# Patient Record
Sex: FEMALE | Race: BLACK OR AFRICAN AMERICAN | Employment: UNEMPLOYED | URBAN - METROPOLITAN AREA
[De-identification: names, ages, dates, MRNs, and addresses within clinical notes are randomized per-mention and may not be internally consistent; named-entity substitution may affect disease eponyms.]

---

## 2019-02-06 ENCOUNTER — APPOINTMENT (OUTPATIENT)
Dept: GENERAL RADIOLOGY | Age: 24
End: 2019-02-06
Payer: COMMERCIAL

## 2019-02-06 ENCOUNTER — HOSPITAL ENCOUNTER (EMERGENCY)
Age: 24
Discharge: HOME | End: 2019-02-07
Attending: EMERGENCY MEDICINE
Payer: COMMERCIAL

## 2019-02-06 DIAGNOSIS — F41.1 ANXIETY STATE: Primary | ICD-10-CM

## 2019-02-06 LAB
ALBUMIN SERPL-MCNC: 3.8 G/DL (ref 3.5–5.2)
ALP BLD-CCNC: 66 U/L (ref 35–104)
ALT SERPL-CCNC: 38 U/L (ref 0–32)
ANION GAP SERPL CALCULATED.3IONS-SCNC: 13 MMOL/L (ref 7–16)
AST SERPL-CCNC: 61 U/L (ref 0–31)
BASOPHILS ABSOLUTE: 0.08 E9/L (ref 0–0.2)
BASOPHILS RELATIVE PERCENT: 1 % (ref 0–2)
BILIRUB SERPL-MCNC: <0.2 MG/DL (ref 0–1.2)
BUN BLDV-MCNC: 10 MG/DL (ref 6–20)
CALCIUM SERPL-MCNC: 9.6 MG/DL (ref 8.6–10.2)
CHLORIDE BLD-SCNC: 103 MMOL/L (ref 98–107)
CO2: 22 MMOL/L (ref 22–29)
CREAT SERPL-MCNC: 0.6 MG/DL (ref 0.5–1)
D DIMER: 225 NG/ML DDU
EOSINOPHILS ABSOLUTE: 0.17 E9/L (ref 0.05–0.5)
EOSINOPHILS RELATIVE PERCENT: 2.2 % (ref 0–6)
GFR AFRICAN AMERICAN: >60
GFR NON-AFRICAN AMERICAN: >60 ML/MIN/1.73
GLUCOSE BLD-MCNC: 139 MG/DL (ref 74–99)
HCG, URINE, POC: NEGATIVE
HCT VFR BLD CALC: 29.1 % (ref 34–48)
HEMOGLOBIN: 8.7 G/DL (ref 11.5–15.5)
IMMATURE GRANULOCYTES #: 0.01 E9/L
IMMATURE GRANULOCYTES %: 0.1 % (ref 0–5)
LYMPHOCYTES ABSOLUTE: 4.98 E9/L (ref 1.5–4)
LYMPHOCYTES RELATIVE PERCENT: 64.6 % (ref 20–42)
Lab: NORMAL
MCH RBC QN AUTO: 22.2 PG (ref 26–35)
MCHC RBC AUTO-ENTMCNC: 29.9 % (ref 32–34.5)
MCV RBC AUTO: 74.2 FL (ref 80–99.9)
MONOCYTES ABSOLUTE: 0.57 E9/L (ref 0.1–0.95)
MONOCYTES RELATIVE PERCENT: 7.4 % (ref 2–12)
NEGATIVE QC PASS/FAIL: NORMAL
NEUTROPHILS ABSOLUTE: 1.9 E9/L (ref 1.8–7.3)
NEUTROPHILS RELATIVE PERCENT: 24.7 % (ref 43–80)
PDW BLD-RTO: 17.1 FL (ref 11.5–15)
PLATELET # BLD: 273 E9/L (ref 130–450)
PMV BLD AUTO: 9.9 FL (ref 7–12)
POSITIVE QC PASS/FAIL: NORMAL
POTASSIUM SERPL-SCNC: 3 MMOL/L (ref 3.5–5)
RBC # BLD: 3.92 E12/L (ref 3.5–5.5)
SODIUM BLD-SCNC: 138 MMOL/L (ref 132–146)
TOTAL PROTEIN: 7.3 G/DL (ref 6.4–8.3)
TROPONIN: <0.01 NG/ML (ref 0–0.03)
WBC # BLD: 7.7 E9/L (ref 4.5–11.5)

## 2019-02-06 PROCEDURE — 36415 COLL VENOUS BLD VENIPUNCTURE: CPT

## 2019-02-06 PROCEDURE — 85378 FIBRIN DEGRADE SEMIQUANT: CPT

## 2019-02-06 PROCEDURE — 80053 COMPREHEN METABOLIC PANEL: CPT

## 2019-02-06 PROCEDURE — 71045 X-RAY EXAM CHEST 1 VIEW: CPT

## 2019-02-06 PROCEDURE — 84484 ASSAY OF TROPONIN QUANT: CPT

## 2019-02-06 PROCEDURE — 99283 EMERGENCY DEPT VISIT LOW MDM: CPT

## 2019-02-06 PROCEDURE — 93005 ELECTROCARDIOGRAM TRACING: CPT | Performed by: EMERGENCY MEDICINE

## 2019-02-06 PROCEDURE — 2580000003 HC RX 258: Performed by: EMERGENCY MEDICINE

## 2019-02-06 PROCEDURE — 85025 COMPLETE CBC W/AUTO DIFF WBC: CPT

## 2019-02-06 RX ORDER — HYDROXYZINE HYDROCHLORIDE 50 MG/ML
50 INJECTION, SOLUTION INTRAMUSCULAR ONCE
Status: COMPLETED | OUTPATIENT
Start: 2019-02-06 | End: 2019-02-07

## 2019-02-06 RX ORDER — 0.9 % SODIUM CHLORIDE 0.9 %
1000 INTRAVENOUS SOLUTION INTRAVENOUS ONCE
Status: COMPLETED | OUTPATIENT
Start: 2019-02-06 | End: 2019-02-07

## 2019-02-06 RX ORDER — POTASSIUM CHLORIDE 20 MEQ/1
40 TABLET, EXTENDED RELEASE ORAL ONCE
Status: COMPLETED | OUTPATIENT
Start: 2019-02-06 | End: 2019-02-07

## 2019-02-06 RX ORDER — SODIUM CHLORIDE 9 MG/ML
INJECTION, SOLUTION INTRAVENOUS CONTINUOUS
Status: DISCONTINUED | OUTPATIENT
Start: 2019-02-06 | End: 2019-02-07 | Stop reason: HOSPADM

## 2019-02-06 RX ADMIN — SODIUM CHLORIDE 1000 ML: 9 INJECTION, SOLUTION INTRAVENOUS at 23:35

## 2019-02-07 VITALS
OXYGEN SATURATION: 98 % | RESPIRATION RATE: 18 BRPM | DIASTOLIC BLOOD PRESSURE: 60 MMHG | HEART RATE: 93 BPM | SYSTOLIC BLOOD PRESSURE: 134 MMHG | TEMPERATURE: 98 F | WEIGHT: 110 LBS

## 2019-02-07 PROCEDURE — 96372 THER/PROPH/DIAG INJ SC/IM: CPT

## 2019-02-07 PROCEDURE — 6370000000 HC RX 637 (ALT 250 FOR IP): Performed by: EMERGENCY MEDICINE

## 2019-02-07 PROCEDURE — 6360000002 HC RX W HCPCS: Performed by: EMERGENCY MEDICINE

## 2019-02-07 RX ORDER — HYDROXYZINE PAMOATE 50 MG/1
50 CAPSULE ORAL 3 TIMES DAILY PRN
Qty: 21 CAPSULE | Refills: 0 | Status: SHIPPED | OUTPATIENT
Start: 2019-02-07 | End: 2019-02-14

## 2019-02-07 RX ADMIN — POTASSIUM CHLORIDE 40 MEQ: 20 TABLET, EXTENDED RELEASE ORAL at 00:11

## 2019-02-07 RX ADMIN — HYDROXYZINE HYDROCHLORIDE 50 MG: 50 INJECTION, SOLUTION INTRAMUSCULAR at 00:15

## 2019-02-08 LAB
EKG ATRIAL RATE: 67 BPM
EKG Q-T INTERVAL: 394 MS
EKG QRS DURATION: 86 MS
EKG QTC CALCULATION (BAZETT): 581 MS
EKG R AXIS: 1 DEGREES
EKG T AXIS: -5 DEGREES
EKG VENTRICULAR RATE: 131 BPM

## 2019-02-09 ENCOUNTER — HOSPITAL ENCOUNTER (EMERGENCY)
Age: 24
Discharge: HOME | End: 2019-02-09
Attending: EMERGENCY MEDICINE
Payer: COMMERCIAL

## 2019-02-09 VITALS
WEIGHT: 100 LBS | TEMPERATURE: 98.9 F | OXYGEN SATURATION: 100 % | HEART RATE: 119 BPM | SYSTOLIC BLOOD PRESSURE: 119 MMHG | RESPIRATION RATE: 20 BRPM | DIASTOLIC BLOOD PRESSURE: 71 MMHG | HEIGHT: 64 IN | BODY MASS INDEX: 17.07 KG/M2

## 2019-02-09 DIAGNOSIS — F41.0 PANIC ATTACK: Primary | ICD-10-CM

## 2019-02-09 LAB
ACETAMINOPHEN LEVEL: <5 MCG/ML (ref 10–30)
ALBUMIN SERPL-MCNC: 4.9 G/DL (ref 3.5–5.2)
ALP BLD-CCNC: 69 U/L (ref 35–104)
ALT SERPL-CCNC: 33 U/L (ref 0–32)
AMPHETAMINE SCREEN, URINE: NOT DETECTED
ANION GAP SERPL CALCULATED.3IONS-SCNC: 13 MMOL/L (ref 7–16)
AST SERPL-CCNC: 33 U/L (ref 0–31)
BACTERIA: ABNORMAL /HPF
BARBITURATE SCREEN URINE: NOT DETECTED
BASOPHILS ABSOLUTE: 0.08 E9/L (ref 0–0.2)
BASOPHILS RELATIVE PERCENT: 1.3 % (ref 0–2)
BENZODIAZEPINE SCREEN, URINE: NOT DETECTED
BILIRUB SERPL-MCNC: 0.3 MG/DL (ref 0–1.2)
BILIRUBIN URINE: ABNORMAL
BLOOD, URINE: NEGATIVE
BUN BLDV-MCNC: 9 MG/DL (ref 6–20)
CALCIUM SERPL-MCNC: 10 MG/DL (ref 8.6–10.2)
CANNABINOID SCREEN URINE: NOT DETECTED
CHLORIDE BLD-SCNC: 103 MMOL/L (ref 98–107)
CLARITY: CLEAR
CO2: 22 MMOL/L (ref 22–29)
COCAINE METABOLITE SCREEN URINE: NOT DETECTED
COLOR: YELLOW
CREAT SERPL-MCNC: 0.6 MG/DL (ref 0.5–1)
EOSINOPHILS ABSOLUTE: 0.11 E9/L (ref 0.05–0.5)
EOSINOPHILS RELATIVE PERCENT: 1.7 % (ref 0–6)
ETHANOL: <10 MG/DL (ref 0–0.08)
GFR AFRICAN AMERICAN: >60
GFR NON-AFRICAN AMERICAN: >60 ML/MIN/1.73
GLUCOSE BLD-MCNC: 95 MG/DL (ref 74–99)
GLUCOSE URINE: NEGATIVE MG/DL
HCG, URINE, POC: NEGATIVE
HCT VFR BLD CALC: 32.9 % (ref 34–48)
HEMOGLOBIN: 9.7 G/DL (ref 11.5–15.5)
IMMATURE GRANULOCYTES #: 0 E9/L
IMMATURE GRANULOCYTES %: 0 % (ref 0–5)
KETONES, URINE: ABNORMAL MG/DL
LEUKOCYTE ESTERASE, URINE: ABNORMAL
LYMPHOCYTES ABSOLUTE: 3 E9/L (ref 1.5–4)
LYMPHOCYTES RELATIVE PERCENT: 47.5 % (ref 20–42)
Lab: NORMAL
MCH RBC QN AUTO: 21.7 PG (ref 26–35)
MCHC RBC AUTO-ENTMCNC: 29.5 % (ref 32–34.5)
MCV RBC AUTO: 73.8 FL (ref 80–99.9)
METHADONE SCREEN, URINE: NOT DETECTED
MONOCYTES ABSOLUTE: 0.44 E9/L (ref 0.1–0.95)
MONOCYTES RELATIVE PERCENT: 7 % (ref 2–12)
NEGATIVE QC PASS/FAIL: NORMAL
NEUTROPHILS ABSOLUTE: 2.69 E9/L (ref 1.8–7.3)
NEUTROPHILS RELATIVE PERCENT: 42.5 % (ref 43–80)
NITRITE, URINE: NEGATIVE
OPIATE SCREEN URINE: NOT DETECTED
PDW BLD-RTO: 16.9 FL (ref 11.5–15)
PH UA: 6 (ref 5–9)
PHENCYCLIDINE SCREEN URINE: NOT DETECTED
PLATELET # BLD: 286 E9/L (ref 130–450)
PMV BLD AUTO: 10 FL (ref 7–12)
POSITIVE QC PASS/FAIL: NORMAL
POTASSIUM SERPL-SCNC: 3.4 MMOL/L (ref 3.5–5)
PROPOXYPHENE SCREEN: NOT DETECTED
PROTEIN UA: 30 MG/DL
RBC # BLD: 4.46 E12/L (ref 3.5–5.5)
RBC UA: ABNORMAL /HPF (ref 0–2)
RENAL EPITHELIAL, UA: ABNORMAL /HPF
SALICYLATE, SERUM: <0.3 MG/DL (ref 0–30)
SODIUM BLD-SCNC: 138 MMOL/L (ref 132–146)
SPECIFIC GRAVITY UA: >=1.03 (ref 1–1.03)
TOTAL PROTEIN: 8.5 G/DL (ref 6.4–8.3)
TRICYCLIC ANTIDEPRESSANTS SCREEN SERUM: NEGATIVE NG/ML
UROBILINOGEN, URINE: 0.2 E.U./DL
WBC # BLD: 6.3 E9/L (ref 4.5–11.5)
WBC UA: ABNORMAL /HPF (ref 0–5)

## 2019-02-09 PROCEDURE — 81001 URINALYSIS AUTO W/SCOPE: CPT

## 2019-02-09 PROCEDURE — 99283 EMERGENCY DEPT VISIT LOW MDM: CPT

## 2019-02-09 PROCEDURE — G0480 DRUG TEST DEF 1-7 CLASSES: HCPCS

## 2019-02-09 PROCEDURE — 85025 COMPLETE CBC W/AUTO DIFF WBC: CPT

## 2019-02-09 PROCEDURE — 80053 COMPREHEN METABOLIC PANEL: CPT

## 2019-02-09 PROCEDURE — 36415 COLL VENOUS BLD VENIPUNCTURE: CPT

## 2019-02-09 PROCEDURE — 80307 DRUG TEST PRSMV CHEM ANLYZR: CPT

## 2019-02-09 PROCEDURE — 6370000000 HC RX 637 (ALT 250 FOR IP): Performed by: EMERGENCY MEDICINE

## 2019-02-09 RX ORDER — LORAZEPAM 1 MG/1
2 TABLET ORAL ONCE
Status: COMPLETED | OUTPATIENT
Start: 2019-02-09 | End: 2019-02-09

## 2019-02-09 RX ORDER — LORAZEPAM 1 MG/1
1 TABLET ORAL EVERY 8 HOURS PRN
Qty: 20 TABLET | Refills: 0 | Status: SHIPPED | OUTPATIENT
Start: 2019-02-09 | End: 2019-02-13

## 2019-02-09 RX ADMIN — LORAZEPAM 2 MG: 1 TABLET ORAL at 06:57

## 2023-05-10 LAB
ABO GROUP (TYPE) IN BLOOD: NORMAL
ALANINE AMINOTRANSFERASE (SGPT) (U/L) IN SER/PLAS: 48 U/L (ref 7–45)
ALBUMIN (G/DL) IN SER/PLAS: 4.3 G/DL (ref 3.4–5)
ALKALINE PHOSPHATASE (U/L) IN SER/PLAS: 78 U/L (ref 33–110)
ANION GAP IN SER/PLAS: 14 MMOL/L (ref 10–20)
ANTIBODY SCREEN: NORMAL
ASPARTATE AMINOTRANSFERASE (SGOT) (U/L) IN SER/PLAS: 19 U/L (ref 9–39)
BILIRUBIN TOTAL (MG/DL) IN SER/PLAS: 0.3 MG/DL (ref 0–1.2)
CALCIUM (MG/DL) IN SER/PLAS: 9.8 MG/DL (ref 8.6–10.6)
CARBON DIOXIDE, TOTAL (MMOL/L) IN SER/PLAS: 24 MMOL/L (ref 21–32)
CHLORIDE (MMOL/L) IN SER/PLAS: 104 MMOL/L (ref 98–107)
CREATININE (MG/DL) IN SER/PLAS: 0.47 MG/DL (ref 0.5–1.05)
CREATININE (MG/DL) IN URINE: 155 MG/DL (ref 20–320)
ERYTHROCYTE DISTRIBUTION WIDTH (RATIO) BY AUTOMATED COUNT: 13.2 % (ref 11.5–14.5)
ERYTHROCYTE MEAN CORPUSCULAR HEMOGLOBIN CONCENTRATION (G/DL) BY AUTOMATED: 32.8 G/DL (ref 32–36)
ERYTHROCYTE MEAN CORPUSCULAR VOLUME (FL) BY AUTOMATED COUNT: 81 FL (ref 80–100)
ERYTHROCYTES (10*6/UL) IN BLOOD BY AUTOMATED COUNT: 4.37 X10E12/L (ref 4–5.2)
GFR FEMALE: >90 ML/MIN/1.73M2
GLUCOSE (MG/DL) IN SER/PLAS: 99 MG/DL (ref 74–99)
HEMATOCRIT (%) IN BLOOD BY AUTOMATED COUNT: 35.4 % (ref 36–46)
HEMOGLOBIN (G/DL) IN BLOOD: 11.6 G/DL (ref 12–16)
HEPATITIS B VIRUS SURFACE AG PRESENCE IN SERUM: NONREACTIVE
HEPATITIS C VIRUS AB PRESENCE IN SERUM: NONREACTIVE
HIV 1/ 2 AG/AB SCREEN: NONREACTIVE
LACTATE DEHYDROGENASE (U/L) IN SER/PLAS BY LAC->PYR RXN: 104 U/L (ref 84–246)
LEUKOCYTES (10*3/UL) IN BLOOD BY AUTOMATED COUNT: 8.4 X10E9/L (ref 4.4–11.3)
NRBC (PER 100 WBCS) BY AUTOMATED COUNT: 0 /100 WBC (ref 0–0)
PLATELETS (10*3/UL) IN BLOOD AUTOMATED COUNT: 326 X10E9/L (ref 150–450)
POTASSIUM (MMOL/L) IN SER/PLAS: 3.7 MMOL/L (ref 3.5–5.3)
PROTEIN (MG/DL) IN URINE: 19 MG/DL (ref 5–24)
PROTEIN TOTAL: 7.6 G/DL (ref 6.4–8.2)
PROTEIN/CREATININE (MG/MG) IN URINE: 0.12 MG/MG CREAT (ref 0–0.17)
REFLEX ADDED, ANEMIA PANEL: ABNORMAL
RH FACTOR: NORMAL
SODIUM (MMOL/L) IN SER/PLAS: 138 MMOL/L (ref 136–145)
SYPHILIS TOTAL AB: NONREACTIVE
URATE (MG/DL) IN SER/PLAS: 2.9 MG/DL (ref 2.3–6.7)
UREA NITROGEN (MG/DL) IN SER/PLAS: 7 MG/DL (ref 6–23)

## 2023-05-11 LAB
CHLAMYDIA TRACH., AMPLIFIED: NEGATIVE
N. GONORRHEA, AMPLIFIED: NEGATIVE
URINE CULTURE: NORMAL

## 2023-05-12 LAB
HEMOGLOBIN A2: 2.8 %
HEMOGLOBIN A: 96.2 %
HEMOGLOBIN F: 1 %
HEMOGLOBIN IDENTIFICATION INTERPRETATION: NORMAL
PATH REVIEW-HGB IDENTIFICATION: NORMAL
RUBELLA VIRUS IGG AB: POSITIVE

## 2023-06-03 LAB — LAB MOLECULAR CA TECHNICAL NOTES: NORMAL

## 2023-08-11 LAB — URINE CULTURE: ABNORMAL

## 2023-08-24 ENCOUNTER — HOSPITAL ENCOUNTER (OUTPATIENT)
Dept: DATA CONVERSION | Facility: HOSPITAL | Age: 28
End: 2023-08-24
Attending: OBSTETRICS & GYNECOLOGY
Payer: COMMERCIAL

## 2023-08-24 DIAGNOSIS — O10.912 UNSPECIFIED PRE-EXISTING HYPERTENSION COMPLICATING PREGNANCY, SECOND TRIMESTER (HHS-HCC): ICD-10-CM

## 2023-08-24 DIAGNOSIS — Z3A.25 25 WEEKS GESTATION OF PREGNANCY (HHS-HCC): ICD-10-CM

## 2023-08-24 DIAGNOSIS — O99.342 OTHER MENTAL DISORDERS COMPLICATING PREGNANCY, SECOND TRIMESTER (HHS-HCC): ICD-10-CM

## 2023-08-24 DIAGNOSIS — O26.892 OTHER SPECIFIED PREGNANCY RELATED CONDITIONS, SECOND TRIMESTER (HHS-HCC): ICD-10-CM

## 2023-08-24 DIAGNOSIS — R06.02 SHORTNESS OF BREATH: ICD-10-CM

## 2023-08-24 DIAGNOSIS — F41.9 ANXIETY DISORDER, UNSPECIFIED: ICD-10-CM

## 2023-08-24 DIAGNOSIS — Z20.822 CONTACT WITH AND (SUSPECTED) EXPOSURE TO COVID-19: ICD-10-CM

## 2023-08-24 DIAGNOSIS — M79.18 MYALGIA, OTHER SITE: ICD-10-CM

## 2023-08-24 DIAGNOSIS — Z34.80 ENCOUNTER FOR SUPERVISION OF OTHER NORMAL PREGNANCY, UNSPECIFIED TRIMESTER (HHS-HCC): ICD-10-CM

## 2023-08-24 LAB — SARS-COV-2 RESULT: NOT DETECTED

## 2023-08-25 LAB — URINE CULTURE: NORMAL

## 2023-09-19 ENCOUNTER — HOSPITAL ENCOUNTER (OUTPATIENT)
Dept: DATA CONVERSION | Facility: HOSPITAL | Age: 28
End: 2023-09-20
Attending: OBSTETRICS & GYNECOLOGY
Payer: COMMERCIAL

## 2023-09-19 DIAGNOSIS — Z3A.29 29 WEEKS GESTATION OF PREGNANCY (HHS-HCC): ICD-10-CM

## 2023-09-19 DIAGNOSIS — O99.891 OTHER SPECIFIED DISEASES AND CONDITIONS COMPLICATING PREGNANCY (HHS-HCC): ICD-10-CM

## 2023-09-19 DIAGNOSIS — M79.604 PAIN IN RIGHT LEG: ICD-10-CM

## 2023-09-19 DIAGNOSIS — M79.605 PAIN IN LEFT LEG: ICD-10-CM

## 2023-09-25 PROBLEM — O13.9 GESTATIONAL HYPERTENSION (HHS-HCC): Status: ACTIVE | Noted: 2017-01-01

## 2023-09-29 VITALS
TEMPERATURE: 97.9 F | WEIGHT: 130.07 LBS | HEART RATE: 104 BPM | OXYGEN SATURATION: 99 % | HEIGHT: 64 IN | BODY MASS INDEX: 22.21 KG/M2 | RESPIRATION RATE: 18 BRPM | DIASTOLIC BLOOD PRESSURE: 71 MMHG | SYSTOLIC BLOOD PRESSURE: 114 MMHG

## 2023-09-29 VITALS
TEMPERATURE: 97.9 F | BODY MASS INDEX: 23.98 KG/M2 | DIASTOLIC BLOOD PRESSURE: 65 MMHG | RESPIRATION RATE: 16 BRPM | HEIGHT: 64 IN | SYSTOLIC BLOOD PRESSURE: 112 MMHG | WEIGHT: 140.43 LBS | HEART RATE: 99 BPM | OXYGEN SATURATION: 98 %

## 2023-09-30 NOTE — PROGRESS NOTES
Current Stage:   Stage: Triage     OB Dating:   EDC/EGA:  ·  Final ZAN 01-Dec-2023   ·  EGA 25.6     Subjective Data:   Antepartum:  Vaginal Bleeding: No   Contractions/Abdominal Pain: No   Discharge/Loss of Fluid: No   Fetal Movement: Good   Fevers/Chills: No   Preeclampsia Symptoms: No   Antepartum:    27 yo  at 25.6 wga by ZAN c/w ###, presents for shortness of breath.    The patient states that she began having shortness of breath last night that has been constant since the onset. She also complains of diffuse upper body myalgias, but denies any fevers, chills, cough, chest pain, dizziness, syncope, abdominal pain, nausea,  vomiting, diarrhea, lower extremity pain or swelling.  Patient denies any personal history of DVT or PE.  She states that she has had good fetal movement and no vaginal discharge or leaking of fluid.  She denies any recent sick contacts.    Pregnancy notable for:  -  OBHx: h/o 2 precipitous pre-term labors at   GYN hx:    PMH: HTN, Anxiety  PSH: Denies  Fam hx: Denies  Meds: No current medications  All: NKDA  Social hx: denies t/e/d        Objective Information:    Objective Information:      T   P  R  BP   MAP  SpO2   Value  36.5  93  16  114/68   85  99%  Date/Time  9:59  9:59  9:59  9:59   9:59  9:59  Range  (36.5C - 36.6C )  (93 - 104 )  (16 - 18 )  (114 - 114 )/ (68 - 71 )  (85 - 85 )  (99% - 99% )      Pain reported at  9:59: 7 = Severe      Physical Exam:   Constitutional: Alert, oriented x3, conversational   Obstetric: Fetal HR: 153, Moderate variability, appropriate  for gestational age  [place]: Quiet   Eyes: Sclera white, EOM intact, no discharge or erythema   ENMT: No hearing deficits, no goiter present   Head/Neck: Normocephalic, atraumatic, full ROM intact   Respiratory/Thorax: Normal respiratory effort, no  wheezes, rales or rhonci. No accessory muscle use.   Cardiovascular: S1 S2 mildly tachycardic with regular  rhythm, no murmurs    Gastrointestinal: Soft, Gravid, non-tender   Musculoskeletal: Grossly WNL   Extremities: No edema, discoloration or pain in BLE   Neurological: Speech clear, no obvious deficits   Psychological: Appropriate mood, behavior. Calm and  cooperative   Skin: No rashes or lesions     Assessment and Plan:   Assessment:    29 yo  at 25.6 wga by ZAN c/w ###, presents for shortness of breath.    #Shortness of breath  :: Patient complains of shortness of breath and myalgias x1 day  :: Likely secondary to viral illness, low suspicion of DVT/PE as the patient is not overtly tachycardic or displaying any other signs or symptoms at this time  -EKG with NSR at rate of 89 bpm.  Normal axis.  MO, QRS and QT intervals are all within normal limits.  No ST elevation or T wave inversions.  No STEMI, compared to previous EKG on 2023  -COVID swab negative    #h/o UTI  :: Diagnosed with UTI in  with no acceptable follow-up culture  -We will obtain culture today and call the patient if any urinary tract infection is present.    #Chronic HTN  ::Normotensive here today  :: Not currently on any medications  -Continue to monitor for signs of HTN at home and with follow-up appointments  -Signs and symptoms of sPEC reviewed with the patient    #Anxiety  :: No complaints of anxiety here today and not currently on any medications  -Continue to monitor at follow-up appointments for signs of anxiety/depression    #Maternal Well-being  -All questions and concerns addressed    Staffed with DR. Romero    Plan of Care Reviewed With:  Plan of Care Reviewed With: patient     Attestation:   Note Completion:  I am a:  Resident/Fellow   Attending Attestation I saw and evaluated the patient.  I personally obtained the key and critical portions of the history and physical exam or was physically present for key and  critical portions performed by the resident/fellow. I reviewed the resident/fellow?s documentation and discussed the patient with  the resident/fellow.  I agree with the resident/fellow?s medical decision making as documented in the note.     I personally evaluated the patient on 24-Aug-2023         Electronic Signatures:  Migue Romero)  (Signed 24-Aug-2023 17:07)   Authored: Note Completion   Co-Signer: Current Stage, OB Dating, Subjective Data, Objective Data, Assessment and Plan, Note Completion  Hank Gonzalez (DO (Resident))  (Signed 24-Aug-2023 13:32)   Authored: Current Stage, OB Dating, Subjective Data,  Objective Data, Assessment and Plan, Note Completion      Last Updated: 24-Aug-2023 17:07 by Migue Romero)

## 2023-09-30 NOTE — PROGRESS NOTES
"    Current Stage:   Stage: Triage     OB Dating:   EDC/EGA:  ·  EGA 29.5     Subjective Data:   Antepartum:  Vaginal Bleeding: No   Contractions/Abdominal Pain: No   Discharge/Loss of Fluid: No   Fetal Movement: Good   Fevers/Chills: No   Preeclampsia Symptoms: No   Antepartum:    29 y/o  @ 29.5 presents to triage c/o leg soreness. States that both of her legs have been sore for an uncertain amount of time. Denies any injury to her legs,  denies alleviating or exacerbating factors. Can walk but \"they hurt\" Reports normal fetal movement, denies ctx, lof and vaginal bleeding.      Objective Information:    Objective Information:      T   P  R  BP   MAP  SpO2   Value  36.6  96  16  114/56   78  98%  Date/Time  0:02  0:02  0:02  0:02   0:02  0:02  Range  (36.6C - 36.6C )  (95 - 107 )  (16 - 16 )  (112 - 114 )/ (56 - 65 )  (78 - 78 )  (98% - 100% )      Pain reported at  0:02: 6 = Moderate      Physical Exam:   Constitutional: alert, oriented, appears comfortable   Obstetric: , mod variability, +accels, no  decels  [place] none  SVE deferred   Extremities: no calf tenderness, reflexes 2+  BLLE with no edema, no lesions or bruising visualized, full ROM     Assessment and Plan:   Assessment:    a: 29 y/o  @ 29.5  leg soreness likely 2/2 third trimester pregnancy, no suspicion for DVT or acute injury/condition  reactive nst    p: discharge home w/precautions, discussed regular tylenol as needed  pt to [place] to schedule appt and/or follow up as needed    gayathri ordonez cnm      Electronic Signatures:  Gayathri Ordonez (APRN-MARQUISM)  (Signed 20-Sep-2023 00:33)   Authored: Current Stage, OB Dating, Subjective Data,  Objective Data, Assessment and Plan, Note Completion      Last Updated: 20-Sep-2023 00:33 by Gayathri Ordonez (APRN-MARQUISM)   "

## 2023-10-13 ENCOUNTER — ANCILLARY PROCEDURE (OUTPATIENT)
Dept: RADIOLOGY | Facility: CLINIC | Age: 28
End: 2023-10-13
Payer: COMMERCIAL

## 2023-10-13 DIAGNOSIS — O10.919 CHRONIC HYPERTENSION AFFECTING PREGNANCY (HHS-HCC): ICD-10-CM

## 2023-10-13 PROCEDURE — 76816 OB US FOLLOW-UP PER FETUS: CPT

## 2023-10-13 PROCEDURE — 76816 OB US FOLLOW-UP PER FETUS: CPT | Performed by: OBSTETRICS & GYNECOLOGY

## 2023-10-13 PROCEDURE — 76819 FETAL BIOPHYS PROFIL W/O NST: CPT | Performed by: OBSTETRICS & GYNECOLOGY

## 2023-10-13 PROCEDURE — 76819 FETAL BIOPHYS PROFIL W/O NST: CPT

## 2023-11-10 ENCOUNTER — ANCILLARY PROCEDURE (OUTPATIENT)
Dept: RADIOLOGY | Facility: CLINIC | Age: 28
End: 2023-11-10
Payer: COMMERCIAL

## 2023-11-10 ENCOUNTER — ROUTINE PRENATAL (OUTPATIENT)
Dept: OBSTETRICS AND GYNECOLOGY | Facility: CLINIC | Age: 28
End: 2023-11-10
Payer: COMMERCIAL

## 2023-11-10 ENCOUNTER — LAB (OUTPATIENT)
Dept: LAB | Facility: LAB | Age: 28
End: 2023-11-10
Payer: COMMERCIAL

## 2023-11-10 VITALS
HEART RATE: 118 BPM | WEIGHT: 150.2 LBS | SYSTOLIC BLOOD PRESSURE: 103 MMHG | DIASTOLIC BLOOD PRESSURE: 70 MMHG | BODY MASS INDEX: 25.8 KG/M2

## 2023-11-10 DIAGNOSIS — Z30.09 CONTRACEPTIVE EDUCATION: ICD-10-CM

## 2023-11-10 DIAGNOSIS — O10.013 PRE-EXISTING ESSENTIAL HYPERTENSION COMPLICATING PREGNANCY, THIRD TRIMESTER (HHS-HCC): ICD-10-CM

## 2023-11-10 DIAGNOSIS — Z3A.36 36 WEEKS GESTATION OF PREGNANCY (HHS-HCC): ICD-10-CM

## 2023-11-10 DIAGNOSIS — O43.199 MARGINAL INSERTION OF UMBILICAL CORD AFFECTING MANAGEMENT OF MOTHER (HHS-HCC): ICD-10-CM

## 2023-11-10 DIAGNOSIS — Z03.74 ENCOUNTER FOR SUSPECTED PROBLEM WITH FETAL GROWTH RULED OUT: ICD-10-CM

## 2023-11-10 DIAGNOSIS — O09.33 LIMITED PRENATAL CARE IN THIRD TRIMESTER (HHS-HCC): ICD-10-CM

## 2023-11-10 DIAGNOSIS — Z3A.36 36 WEEKS GESTATION OF PREGNANCY (HHS-HCC): Primary | ICD-10-CM

## 2023-11-10 LAB
ERYTHROCYTE [DISTWIDTH] IN BLOOD BY AUTOMATED COUNT: 13.2 % (ref 11.5–14.5)
GLUCOSE 1H P 50 G GLC PO SERPL-MCNC: 131 MG/DL
HCT VFR BLD AUTO: 29.5 % (ref 36–46)
HGB BLD-MCNC: 9.2 G/DL (ref 12–16)
MCH RBC QN AUTO: 27.3 PG (ref 26–34)
MCHC RBC AUTO-ENTMCNC: 31.2 G/DL (ref 32–36)
MCV RBC AUTO: 88 FL (ref 80–100)
NRBC BLD-RTO: 0 /100 WBCS (ref 0–0)
PLATELET # BLD AUTO: 240 X10*3/UL (ref 150–450)
RBC # BLD AUTO: 3.37 X10*6/UL (ref 4–5.2)
WBC # BLD AUTO: 9.1 X10*3/UL (ref 4.4–11.3)

## 2023-11-10 PROCEDURE — 99214 OFFICE O/P EST MOD 30 MIN: CPT | Mod: TH,25

## 2023-11-10 PROCEDURE — 82746 ASSAY OF FOLIC ACID SERUM: CPT

## 2023-11-10 PROCEDURE — 87081 CULTURE SCREEN ONLY: CPT

## 2023-11-10 PROCEDURE — 76816 OB US FOLLOW-UP PER FETUS: CPT

## 2023-11-10 PROCEDURE — 76819 FETAL BIOPHYS PROFIL W/O NST: CPT | Performed by: OBSTETRICS & GYNECOLOGY

## 2023-11-10 PROCEDURE — 86780 TREPONEMA PALLIDUM: CPT

## 2023-11-10 PROCEDURE — 82947 ASSAY GLUCOSE BLOOD QUANT: CPT

## 2023-11-10 PROCEDURE — 82728 ASSAY OF FERRITIN: CPT

## 2023-11-10 PROCEDURE — 83550 IRON BINDING TEST: CPT

## 2023-11-10 PROCEDURE — 76819 FETAL BIOPHYS PROFIL W/O NST: CPT

## 2023-11-10 PROCEDURE — 76816 OB US FOLLOW-UP PER FETUS: CPT | Performed by: OBSTETRICS & GYNECOLOGY

## 2023-11-10 PROCEDURE — 85027 COMPLETE CBC AUTOMATED: CPT

## 2023-11-10 PROCEDURE — 82607 VITAMIN B-12: CPT

## 2023-11-10 PROCEDURE — 99214 OFFICE O/P EST MOD 30 MIN: CPT

## 2023-11-10 PROCEDURE — 36415 COLL VENOUS BLD VENIPUNCTURE: CPT

## 2023-11-11 PROBLEM — O43.199 MARGINAL INSERTION OF UMBILICAL CORD AFFECTING MANAGEMENT OF MOTHER (HHS-HCC): Status: ACTIVE | Noted: 2023-11-11

## 2023-11-11 PROBLEM — O09.33 LIMITED PRENATAL CARE IN THIRD TRIMESTER (HHS-HCC): Status: ACTIVE | Noted: 2023-11-11

## 2023-11-11 LAB
FERRITIN SERPL-MCNC: 14 NG/ML
FOLATE SERPL-MCNC: 12.8 NG/ML
IRON SATN MFR SERPL: NORMAL %
IRON SERPL-MCNC: 42 UG/DL
REFLEX ADDED, ANEMIA PANEL: NORMAL
T PALLIDUM AB SER QL: NONREACTIVE
TIBC SERPL-MCNC: NORMAL UG/DL
UIBC SERPL-MCNC: >450 UG/DL
VIT B12 SERPL-MCNC: 233 PG/ML

## 2023-11-12 DIAGNOSIS — D50.9 IRON DEFICIENCY ANEMIA DURING PREGNANCY (HHS-HCC): Primary | ICD-10-CM

## 2023-11-12 DIAGNOSIS — O99.019 IRON DEFICIENCY ANEMIA DURING PREGNANCY (HHS-HCC): Primary | ICD-10-CM

## 2023-11-12 LAB — GP B STREP GENITAL QL CULT: ABNORMAL

## 2023-11-12 RX ORDER — FERROUS SULFATE 325(65) MG
325 TABLET ORAL 2 TIMES DAILY
Qty: 30 TABLET | Refills: 1 | Status: SHIPPED | OUTPATIENT
Start: 2023-11-12 | End: 2023-12-22 | Stop reason: ALTCHOICE

## 2023-11-12 RX ORDER — DOCUSATE SODIUM 100 MG/1
100 CAPSULE, LIQUID FILLED ORAL 2 TIMES DAILY PRN
Qty: 60 CAPSULE | Refills: 5 | Status: SHIPPED | OUTPATIENT
Start: 2023-11-12 | End: 2023-11-28 | Stop reason: HOSPADM

## 2023-11-12 NOTE — PROGRESS NOTES
Assessment/Plan   Diagnoses and all orders for this visit:  36 weeks gestation of pregnancy  -     Group B Streptococcus (GBS) Prenatal Screen, Culture  -     Syphilis Screen with Reflex; Future  -     Glucose, 1 Hour Screen, Pregnancy; Future  -     CBC Anemia Panel With Reflex,Pregnancy; Future  Limited prenatal care in third trimester  Contraceptive education  Marginal insertion of umbilical cord affecting management of mother      Coping mechanisms and pain management options during labor discussed, plans on epidural  Postpartum contraception options discussed, desires TL - consent signed today. She is aware of 30 days consent waiting period.   Discussed routine GBS screening, to be completed today.    Pt has not yet has 2nd trimester labs - CBC, syphilis, and GTT ordered today.     Reviewed s/sx of labor, warning signs, fetal movement counts, and when to call provider  Follow up in 1 week for a routine prenatal visit.    EVIE Weathers-NISHI    Subjective     Imelda Mendoza is a 28 y.o.  at 36w4d with a working estimated date of delivery of 2023, by Ultrasound who presents for a routine prenatal visit. She denies vaginal bleeding, leakage of fluid, decreased fetal movements, or contractions.    Pt has not been seen since 23 weeks. Reports she does not has trouble making to appointments but was not aware she has to schedule OBFU appointments in addition to US appointments. Pt reports she will plan to be here weekly from here on out and does not anticipate trouble making it to appointments. Offered support/resources and she declined.     Her pregnancy is complicated by:  Imelda Mendoza Problems (from 05/10/23 to present)       Problem Noted Resolved    Marginal cord insertion 2017 by Nasrin Verdugo, RN No        Limited prenatal care          Objective   Physical Exam:   Weight: 68.1 kg (150 lb 3.2 oz)  TWG: Not found.  Expected Total Weight Gain: Could not be calculated   [place] BMI: Could not  be calculated  BP: 103/70  Fetal Heart Rate:  (UH done today) Fundal Height (cm): 36 cm Presentation: Vertex  Dilation: 2 Effacement (%): 60 Fetal Station: -3    Postpartum Depression: Not on file        Prenatal Labs  Lab Results   Component Value Date    HGB 9.2 (L) 11/10/2023    HCT 29.5 (L) 11/10/2023     11/10/2023    ABO A 05/10/2023    LABRH POS 05/10/2023    NEISSGONOAMP NEGATIVE 05/10/2023    CHLAMTRACAMP NEGATIVE 05/10/2023    SYPHT Nonreactive 11/10/2023    HEPBSAG NONREACTIVE 05/10/2023    HIV1X2 NONREACTIVE 05/10/2023    URINECULTURE MIXED URETHRAL FREDERICK. 08/24/2023     Lab Results   Component Value Date    GLUC1P 131 11/10/2023     Group B Strep Screen   Date Value Ref Range Status   11/10/2023 Culture in progress  Preliminary        Imaging  The most recent ultrasound was performed on The most recent ultrasound study is not finalized with a study GA of The most recent ultrasound study is not finalized and EFW of The most recent ultrasound study is not finalized.  The most recent ultrasound study is not finalized  The most recent ultrasound study is not finalized

## 2023-11-13 ENCOUNTER — PHARMACY VISIT (OUTPATIENT)
Dept: PHARMACY | Facility: CLINIC | Age: 28
End: 2023-11-13
Payer: MEDICARE

## 2023-11-13 DIAGNOSIS — O99.019 ANTEPARTUM ANEMIA (HHS-HCC): Primary | ICD-10-CM

## 2023-11-13 PROCEDURE — RXMED WILLOW AMBULATORY MEDICATION CHARGE

## 2023-11-13 RX ORDER — DIPHENHYDRAMINE HYDROCHLORIDE 50 MG/ML
50 INJECTION INTRAMUSCULAR; INTRAVENOUS AS NEEDED
OUTPATIENT
Start: 2023-11-13

## 2023-11-13 RX ORDER — EPINEPHRINE 0.3 MG/.3ML
0.3 INJECTION SUBCUTANEOUS EVERY 5 MIN PRN
OUTPATIENT
Start: 2023-11-13

## 2023-11-13 RX ORDER — FAMOTIDINE 10 MG/ML
20 INJECTION INTRAVENOUS ONCE AS NEEDED
OUTPATIENT
Start: 2023-11-13

## 2023-11-13 RX ORDER — ALBUTEROL SULFATE 0.83 MG/ML
3 SOLUTION RESPIRATORY (INHALATION) AS NEEDED
OUTPATIENT
Start: 2023-11-13

## 2023-11-14 ENCOUNTER — TELEPHONE (OUTPATIENT)
Dept: OBSTETRICS AND GYNECOLOGY | Facility: HOSPITAL | Age: 28
End: 2023-11-14
Payer: COMMERCIAL

## 2023-11-17 ENCOUNTER — ROUTINE PRENATAL (OUTPATIENT)
Dept: OBSTETRICS AND GYNECOLOGY | Facility: CLINIC | Age: 28
End: 2023-11-17
Payer: COMMERCIAL

## 2023-11-17 VITALS — SYSTOLIC BLOOD PRESSURE: 120 MMHG | DIASTOLIC BLOOD PRESSURE: 77 MMHG | WEIGHT: 152 LBS | BODY MASS INDEX: 26.11 KG/M2

## 2023-11-17 DIAGNOSIS — O09.33 LIMITED PRENATAL CARE IN THIRD TRIMESTER (HHS-HCC): ICD-10-CM

## 2023-11-17 DIAGNOSIS — R82.71 GBS BACTERIURIA: ICD-10-CM

## 2023-11-17 DIAGNOSIS — O43.199 MARGINAL INSERTION OF UMBILICAL CORD AFFECTING MANAGEMENT OF MOTHER (HHS-HCC): ICD-10-CM

## 2023-11-17 DIAGNOSIS — D50.8 OTHER IRON DEFICIENCY ANEMIA: ICD-10-CM

## 2023-11-17 DIAGNOSIS — O09.93 SUPERVISION OF HIGH RISK PREGNANCY IN THIRD TRIMESTER (HHS-HCC): Primary | ICD-10-CM

## 2023-11-17 PROBLEM — D50.9 IRON DEFICIENCY ANEMIA: Status: ACTIVE | Noted: 2023-11-13

## 2023-11-17 PROCEDURE — 99213 OFFICE O/P EST LOW 20 MIN: CPT | Mod: GC,TH | Performed by: STUDENT IN AN ORGANIZED HEALTH CARE EDUCATION/TRAINING PROGRAM

## 2023-11-17 PROCEDURE — 99213 OFFICE O/P EST LOW 20 MIN: CPT | Performed by: STUDENT IN AN ORGANIZED HEALTH CARE EDUCATION/TRAINING PROGRAM

## 2023-11-17 ASSESSMENT — ENCOUNTER SYMPTOMS
MUSCULOSKELETAL NEGATIVE: 0
EYES NEGATIVE: 0
PSYCHIATRIC NEGATIVE: 0
NEUROLOGICAL NEGATIVE: 0
RESPIRATORY NEGATIVE: 0
ENDOCRINE NEGATIVE: 0
CARDIOVASCULAR NEGATIVE: 0
CONSTITUTIONAL NEGATIVE: 0
GASTROINTESTINAL NEGATIVE: 0
ALLERGIC/IMMUNOLOGIC NEGATIVE: 0
HEMATOLOGIC/LYMPHATIC NEGATIVE: 0

## 2023-11-18 PROBLEM — O09.93 SUPERVISION OF HIGH RISK PREGNANCY IN THIRD TRIMESTER (HHS-HCC): Status: ACTIVE | Noted: 2023-11-18

## 2023-11-18 NOTE — PROGRESS NOTES
Ob Follow-up  2023    SUBJECTIVE    HPI: Imelda Mendoza is a 28 y.o.  at 37w4d here for RPNV.  She has no contractions, bleeding, or LOF. Reports normal fetal movement.     OBJECTIVE  Visit Vitals  /77 Comment: Pt heart rate 108   Wt 68.9 kg (152 lb)   LMP 2023   BMI 26.11 kg/m²   OB Status Pregnant   Smoking Status Never Assessed   BSA 1.76 m²      FHT: 146    ASSESSMENT & PLAN    Imelda Mendoza is a 28 y.o.  at 37w4d here for the following concerns we addressed today:    Problem List Items Addressed This Visit       GBS bacteriuria    Iron deficiency anemia    Overview     - Continue PO iron  - For IV iron if unresponsive to PO, recheck at next visit         Limited prenatal care in third trimester    Marginal insertion of umbilical cord affecting management of mother    Supervision of high risk pregnancy in third trimester - Primary    Overview     Dating:   [x] Prenatal Labs  [x] 1hr GCT at 24-28wks: 11/10  [x] Tdap (27-36wks): declined, readdress at next visit   [x] Flu Shot: declined   [x] COVID vaccine: declined   [x] GBS at 36 wks: GBS+ UTI  [] Breastfeeding  [] Postpartum Birth control method:   [] 39 weeks discussion of IOL vs. Expectant management:  [] Mode of delivery:            No orders of the defined types were placed in this encounter.     RTC in 1 weeks    Madina Chaudhry MD

## 2023-11-20 NOTE — PROGRESS NOTES
I saw and evaluated the patient. I personally obtained the key and critical portions of the history and physical exam or was physically present for key and critical portions performed by the resident/fellow. I reviewed the resident/fellow's documentation and discussed the patient with the resident/fellow. I agree with the resident/fellow's medical decision making as documented in the note.    Apryl Galeas MD

## 2023-11-26 ENCOUNTER — ANESTHESIA EVENT (OUTPATIENT)
Dept: OBSTETRICS AND GYNECOLOGY | Facility: HOSPITAL | Age: 28
End: 2023-11-26
Payer: COMMERCIAL

## 2023-11-26 ENCOUNTER — ANESTHESIA EVENT (OUTPATIENT)
Dept: POSTPARTUM | Facility: HOSPITAL | Age: 28
End: 2023-11-26
Payer: COMMERCIAL

## 2023-11-26 ENCOUNTER — HOSPITAL ENCOUNTER (INPATIENT)
Facility: HOSPITAL | Age: 28
LOS: 2 days | Discharge: HOME | End: 2023-11-28
Attending: OBSTETRICS & GYNECOLOGY | Admitting: OBSTETRICS & GYNECOLOGY
Payer: COMMERCIAL

## 2023-11-26 ENCOUNTER — ANESTHESIA (OUTPATIENT)
Dept: OBSTETRICS AND GYNECOLOGY | Facility: HOSPITAL | Age: 28
End: 2023-11-26
Payer: COMMERCIAL

## 2023-11-26 ENCOUNTER — ANESTHESIA (OUTPATIENT)
Dept: POSTPARTUM | Facility: HOSPITAL | Age: 28
End: 2023-11-26
Payer: COMMERCIAL

## 2023-11-26 DIAGNOSIS — Z30.011 ENCOUNTER FOR INITIAL PRESCRIPTION OF CONTRACEPTIVE PILLS: ICD-10-CM

## 2023-11-26 DIAGNOSIS — Z37.9 NORMAL LABOR (HHS-HCC): ICD-10-CM

## 2023-11-26 PROBLEM — I10 HTN (HYPERTENSION): Status: ACTIVE | Noted: 2023-11-26

## 2023-11-26 LAB
ABO GROUP (TYPE) IN BLOOD: NORMAL
ANTIBODY SCREEN: NORMAL
APPEARANCE UR: CLEAR
APTT PPP: 23 SECONDS (ref 27–38)
APTT PPP: 27 SECONDS (ref 27–38)
BILIRUB UR STRIP.AUTO-MCNC: NEGATIVE MG/DL
BLOOD EXPIRATION DATE: NORMAL
COLOR UR: YELLOW
DISPENSE STATUS: NORMAL
ERYTHROCYTE [DISTWIDTH] IN BLOOD BY AUTOMATED COUNT: 14 % (ref 11.5–14.5)
ERYTHROCYTE [DISTWIDTH] IN BLOOD BY AUTOMATED COUNT: 14.6 % (ref 11.5–14.5)
ERYTHROCYTE [DISTWIDTH] IN BLOOD BY AUTOMATED COUNT: 14.6 % (ref 11.5–14.5)
FIBRINOGEN PPP-MCNC: 390 MG/DL (ref 200–400)
FIBRINOGEN PPP-MCNC: 430 MG/DL (ref 200–400)
GLUCOSE UR STRIP.AUTO-MCNC: NEGATIVE MG/DL
HCT VFR BLD AUTO: 26.9 % (ref 36–46)
HCT VFR BLD AUTO: 30 % (ref 36–46)
HCT VFR BLD AUTO: 30.6 % (ref 36–46)
HGB BLD-MCNC: 8.7 G/DL (ref 12–16)
HGB BLD-MCNC: 9.4 G/DL (ref 12–16)
HGB BLD-MCNC: 9.6 G/DL (ref 12–16)
INR PPP: 1 (ref 0.9–1.1)
INR PPP: 1 (ref 0.9–1.1)
KETONES UR STRIP.AUTO-MCNC: NEGATIVE MG/DL
LEUKOCYTE ESTERASE UR QL STRIP.AUTO: ABNORMAL
MCH RBC QN AUTO: 27.6 PG (ref 26–34)
MCH RBC QN AUTO: 27.9 PG (ref 26–34)
MCH RBC QN AUTO: 28.4 PG (ref 26–34)
MCHC RBC AUTO-ENTMCNC: 30.7 G/DL (ref 32–36)
MCHC RBC AUTO-ENTMCNC: 32 G/DL (ref 32–36)
MCHC RBC AUTO-ENTMCNC: 32.3 G/DL (ref 32–36)
MCV RBC AUTO: 86 FL (ref 80–100)
MCV RBC AUTO: 86 FL (ref 80–100)
MCV RBC AUTO: 92 FL (ref 80–100)
MUCOUS THREADS #/AREA URNS AUTO: ABNORMAL /LPF
NITRITE UR QL STRIP.AUTO: NEGATIVE
NRBC BLD-RTO: 0 /100 WBCS (ref 0–0)
PH UR STRIP.AUTO: 7 [PH]
PLATELET # BLD AUTO: 184 X10*3/UL (ref 150–450)
PLATELET # BLD AUTO: 222 X10*3/UL (ref 150–450)
PLATELET # BLD AUTO: 230 X10*3/UL (ref 150–450)
POC APPEARANCE, URINE: CLEAR
POC BILIRUBIN, URINE: NEGATIVE
POC BLOOD, URINE: ABNORMAL
POC COLOR, URINE: ABNORMAL
POC GLUCOSE, URINE: NEGATIVE MG/DL
POC KETONES, URINE: NEGATIVE MG/DL
POC LEUKOCYTES, URINE: ABNORMAL
POC NITRITE,URINE: NEGATIVE
POC PH, URINE: 7 PH
POC PROTEIN, URINE: ABNORMAL MG/DL
POC SPECIFIC GRAVITY, URINE: 1.02
POC UROBILINOGEN, URINE: 1 EU/DL
PRODUCT BLOOD TYPE: 6200
PRODUCT CODE: NORMAL
PROT UR STRIP.AUTO-MCNC: NEGATIVE MG/DL
PROTHROMBIN TIME: 10.8 SECONDS (ref 9.8–12.8)
PROTHROMBIN TIME: 11.3 SECONDS (ref 9.8–12.8)
RBC # BLD AUTO: 3.12 X10*6/UL (ref 4–5.2)
RBC # BLD AUTO: 3.31 X10*6/UL (ref 4–5.2)
RBC # BLD AUTO: 3.48 X10*6/UL (ref 4–5.2)
RBC # UR STRIP.AUTO: NEGATIVE /UL
RBC #/AREA URNS AUTO: ABNORMAL /HPF
RH FACTOR (ANTIGEN D): NORMAL
SP GR UR STRIP.AUTO: 1.01
SQUAMOUS #/AREA URNS AUTO: ABNORMAL /HPF
T PALLIDUM AB SER QL: NONREACTIVE
UNIT ABO: NORMAL
UNIT NUMBER: NORMAL
UNIT RH: NORMAL
UNIT VOLUME: 350
UROBILINOGEN UR STRIP.AUTO-MCNC: 2 MG/DL
WBC # BLD AUTO: 19.6 X10*3/UL (ref 4.4–11.3)
WBC # BLD AUTO: 19.8 X10*3/UL (ref 4.4–11.3)
WBC # BLD AUTO: 9.5 X10*3/UL (ref 4.4–11.3)
WBC #/AREA URNS AUTO: ABNORMAL /HPF
XM INTEP: NORMAL

## 2023-11-26 PROCEDURE — 86901 BLOOD TYPING SEROLOGIC RH(D): CPT | Performed by: STUDENT IN AN ORGANIZED HEALTH CARE EDUCATION/TRAINING PROGRAM

## 2023-11-26 PROCEDURE — 36430 TRANSFUSION BLD/BLD COMPNT: CPT

## 2023-11-26 PROCEDURE — 86780 TREPONEMA PALLIDUM: CPT | Performed by: STUDENT IN AN ORGANIZED HEALTH CARE EDUCATION/TRAINING PROGRAM

## 2023-11-26 PROCEDURE — P9016 RBC LEUKOCYTES REDUCED: HCPCS

## 2023-11-26 PROCEDURE — 86920 COMPATIBILITY TEST SPIN: CPT

## 2023-11-26 PROCEDURE — 85610 PROTHROMBIN TIME: CPT | Performed by: STUDENT IN AN ORGANIZED HEALTH CARE EDUCATION/TRAINING PROGRAM

## 2023-11-26 PROCEDURE — 2500000005 HC RX 250 GENERAL PHARMACY W/O HCPCS: Performed by: STUDENT IN AN ORGANIZED HEALTH CARE EDUCATION/TRAINING PROGRAM

## 2023-11-26 PROCEDURE — 3700000018 HC OB ANESTHESIA C-SECTION: Performed by: OBSTETRICS & GYNECOLOGY

## 2023-11-26 PROCEDURE — 96372 THER/PROPH/DIAG INJ SC/IM: CPT | Performed by: STUDENT IN AN ORGANIZED HEALTH CARE EDUCATION/TRAINING PROGRAM

## 2023-11-26 PROCEDURE — 10907ZC DRAINAGE OF AMNIOTIC FLUID, THERAPEUTIC FROM PRODUCTS OF CONCEPTION, VIA NATURAL OR ARTIFICIAL OPENING: ICD-10-PCS | Performed by: NURSE PRACTITIONER

## 2023-11-26 PROCEDURE — 85027 COMPLETE CBC AUTOMATED: CPT | Performed by: STUDENT IN AN ORGANIZED HEALTH CARE EDUCATION/TRAINING PROGRAM

## 2023-11-26 PROCEDURE — 1100000001 HC PRIVATE ROOM DAILY

## 2023-11-26 PROCEDURE — 36415 COLL VENOUS BLD VENIPUNCTURE: CPT | Performed by: STUDENT IN AN ORGANIZED HEALTH CARE EDUCATION/TRAINING PROGRAM

## 2023-11-26 PROCEDURE — 3700000014 HC AN EPIDURAL BLOCK CHARGE: Performed by: OBSTETRICS & GYNECOLOGY

## 2023-11-26 PROCEDURE — 94760 N-INVAS EAR/PLS OXIMETRY 1: CPT

## 2023-11-26 PROCEDURE — 7100000016 HC LABOR RECOVERY PER HOUR: Performed by: OBSTETRICS & GYNECOLOGY

## 2023-11-26 PROCEDURE — 7210000002 HC LABOR PER HOUR

## 2023-11-26 PROCEDURE — 2500000001 HC RX 250 WO HCPCS SELF ADMINISTERED DRUGS (ALT 637 FOR MEDICARE OP): Performed by: STUDENT IN AN ORGANIZED HEALTH CARE EDUCATION/TRAINING PROGRAM

## 2023-11-26 PROCEDURE — 88307 TISSUE EXAM BY PATHOLOGIST: CPT | Performed by: PATHOLOGY

## 2023-11-26 PROCEDURE — 99140 ANES COMP EMERGENCY COND: CPT | Performed by: STUDENT IN AN ORGANIZED HEALTH CARE EDUCATION/TRAINING PROGRAM

## 2023-11-26 PROCEDURE — 10D17ZZ EXTRACTION OF PRODUCTS OF CONCEPTION, RETAINED, VIA NATURAL OR ARTIFICIAL OPENING: ICD-10-PCS | Performed by: OBSTETRICS & GYNECOLOGY

## 2023-11-26 PROCEDURE — 59409 OBSTETRICAL CARE: CPT | Performed by: MIDWIFE

## 2023-11-26 PROCEDURE — 99214 OFFICE O/P EST MOD 30 MIN: CPT | Mod: 25

## 2023-11-26 PROCEDURE — 85384 FIBRINOGEN ACTIVITY: CPT | Performed by: STUDENT IN AN ORGANIZED HEALTH CARE EDUCATION/TRAINING PROGRAM

## 2023-11-26 PROCEDURE — 2500000004 HC RX 250 GENERAL PHARMACY W/ HCPCS (ALT 636 FOR OP/ED)

## 2023-11-26 PROCEDURE — 2500000004 HC RX 250 GENERAL PHARMACY W/ HCPCS (ALT 636 FOR OP/ED): Performed by: STUDENT IN AN ORGANIZED HEALTH CARE EDUCATION/TRAINING PROGRAM

## 2023-11-26 PROCEDURE — 7100000016 HC LABOR RECOVERY PER HOUR

## 2023-11-26 PROCEDURE — 3E033VJ INTRODUCTION OF OTHER HORMONE INTO PERIPHERAL VEIN, PERCUTANEOUS APPROACH: ICD-10-PCS | Performed by: NURSE PRACTITIONER

## 2023-11-26 PROCEDURE — 59410 OBSTETRICAL CARE: CPT | Performed by: MIDWIFE

## 2023-11-26 PROCEDURE — A59414 PR DELIVER PLACENTA: Performed by: STUDENT IN AN ORGANIZED HEALTH CARE EDUCATION/TRAINING PROGRAM

## 2023-11-26 PROCEDURE — 88307 TISSUE EXAM BY PATHOLOGIST: CPT | Mod: TC,SUR | Performed by: STUDENT IN AN ORGANIZED HEALTH CARE EDUCATION/TRAINING PROGRAM

## 2023-11-26 PROCEDURE — 81001 URINALYSIS AUTO W/SCOPE: CPT

## 2023-11-26 RX ORDER — MORPHINE SULFATE 2 MG/ML
2 INJECTION, SOLUTION INTRAMUSCULAR; INTRAVENOUS ONCE
Status: COMPLETED | OUTPATIENT
Start: 2023-11-26 | End: 2023-11-26

## 2023-11-26 RX ORDER — NIFEDIPINE 10 MG/1
10 CAPSULE ORAL ONCE AS NEEDED
Status: DISCONTINUED | OUTPATIENT
Start: 2023-11-26 | End: 2023-11-26

## 2023-11-26 RX ORDER — PENICILLIN G 3000000 [IU]/50ML
3 INJECTION, SOLUTION INTRAVENOUS EVERY 4 HOURS
Status: DISCONTINUED | OUTPATIENT
Start: 2023-11-26 | End: 2023-11-26

## 2023-11-26 RX ORDER — CEFAZOLIN SODIUM 2 G/100ML
2 INJECTION, SOLUTION INTRAVENOUS ONCE
Status: COMPLETED | OUTPATIENT
Start: 2023-11-26 | End: 2023-11-26

## 2023-11-26 RX ORDER — MISOPROSTOL 200 UG/1
800 TABLET ORAL ONCE AS NEEDED
Status: DISCONTINUED | OUTPATIENT
Start: 2023-11-26 | End: 2023-11-28 | Stop reason: HOSPADM

## 2023-11-26 RX ORDER — ACETAMINOPHEN 325 MG/1
975 TABLET ORAL EVERY 6 HOURS
Status: DISCONTINUED | OUTPATIENT
Start: 2023-11-26 | End: 2023-11-28 | Stop reason: HOSPADM

## 2023-11-26 RX ORDER — DIPHENHYDRAMINE HYDROCHLORIDE 50 MG/ML
25 INJECTION INTRAMUSCULAR; INTRAVENOUS EVERY 6 HOURS PRN
Status: DISCONTINUED | OUTPATIENT
Start: 2023-11-26 | End: 2023-11-28 | Stop reason: HOSPADM

## 2023-11-26 RX ORDER — METHYLERGONOVINE MALEATE 0.2 MG/ML
0.2 INJECTION INTRAVENOUS ONCE AS NEEDED
Status: DISCONTINUED | OUTPATIENT
Start: 2023-11-26 | End: 2023-11-28 | Stop reason: HOSPADM

## 2023-11-26 RX ORDER — POLYETHYLENE GLYCOL 3350 17 G/17G
17 POWDER, FOR SOLUTION ORAL 2 TIMES DAILY PRN
Status: DISCONTINUED | OUTPATIENT
Start: 2023-11-26 | End: 2023-11-28 | Stop reason: HOSPADM

## 2023-11-26 RX ORDER — METOCLOPRAMIDE HYDROCHLORIDE 5 MG/ML
10 INJECTION INTRAMUSCULAR; INTRAVENOUS EVERY 6 HOURS PRN
Status: DISCONTINUED | OUTPATIENT
Start: 2023-11-26 | End: 2023-11-26

## 2023-11-26 RX ORDER — ADHESIVE BANDAGE
10 BANDAGE TOPICAL
Status: DISCONTINUED | OUTPATIENT
Start: 2023-11-26 | End: 2023-11-28 | Stop reason: HOSPADM

## 2023-11-26 RX ORDER — ONDANSETRON 4 MG/1
4 TABLET, FILM COATED ORAL EVERY 6 HOURS PRN
Status: DISCONTINUED | OUTPATIENT
Start: 2023-11-26 | End: 2023-11-26

## 2023-11-26 RX ORDER — ONDANSETRON HYDROCHLORIDE 2 MG/ML
4 INJECTION, SOLUTION INTRAVENOUS EVERY 6 HOURS PRN
Status: DISCONTINUED | OUTPATIENT
Start: 2023-11-26 | End: 2023-11-26

## 2023-11-26 RX ORDER — METRONIDAZOLE 500 MG/1
500 TABLET ORAL ONCE
Status: COMPLETED | OUTPATIENT
Start: 2023-11-26 | End: 2023-11-26

## 2023-11-26 RX ORDER — OXYTOCIN 10 [USP'U]/ML
10 INJECTION, SOLUTION INTRAMUSCULAR; INTRAVENOUS ONCE AS NEEDED
Status: DISCONTINUED | OUTPATIENT
Start: 2023-11-26 | End: 2023-11-26

## 2023-11-26 RX ORDER — LABETALOL HYDROCHLORIDE 5 MG/ML
20 INJECTION, SOLUTION INTRAVENOUS ONCE AS NEEDED
Status: DISCONTINUED | OUTPATIENT
Start: 2023-11-26 | End: 2023-11-26

## 2023-11-26 RX ORDER — OXYTOCIN 10 [USP'U]/ML
10 INJECTION, SOLUTION INTRAMUSCULAR; INTRAVENOUS ONCE AS NEEDED
Status: DISCONTINUED | OUTPATIENT
Start: 2023-11-26 | End: 2023-11-28 | Stop reason: HOSPADM

## 2023-11-26 RX ORDER — OXYTOCIN/0.9 % SODIUM CHLORIDE 30/500 ML
60 PLASTIC BAG, INJECTION (ML) INTRAVENOUS ONCE AS NEEDED
Status: DISCONTINUED | OUTPATIENT
Start: 2023-11-26 | End: 2023-11-26

## 2023-11-26 RX ORDER — TERBUTALINE SULFATE 1 MG/ML
0.25 INJECTION SUBCUTANEOUS ONCE AS NEEDED
Status: DISCONTINUED | OUTPATIENT
Start: 2023-11-26 | End: 2023-11-26

## 2023-11-26 RX ORDER — ONDANSETRON 4 MG/1
4 TABLET, FILM COATED ORAL EVERY 6 HOURS PRN
Status: DISCONTINUED | OUTPATIENT
Start: 2023-11-26 | End: 2023-11-28 | Stop reason: HOSPADM

## 2023-11-26 RX ORDER — PROPOFOL 10 MG/ML
INJECTION, EMULSION INTRAVENOUS AS NEEDED
Status: DISCONTINUED | OUTPATIENT
Start: 2023-11-26 | End: 2023-11-26

## 2023-11-26 RX ORDER — OXYTOCIN/0.9 % SODIUM CHLORIDE 30/500 ML
60 PLASTIC BAG, INJECTION (ML) INTRAVENOUS ONCE AS NEEDED
Status: DISCONTINUED | OUTPATIENT
Start: 2023-11-26 | End: 2023-11-28 | Stop reason: HOSPADM

## 2023-11-26 RX ORDER — TRANEXAMIC ACID 100 MG/ML
1000 INJECTION, SOLUTION INTRAVENOUS ONCE AS NEEDED
Status: COMPLETED | OUTPATIENT
Start: 2023-11-26 | End: 2023-11-26

## 2023-11-26 RX ORDER — IBUPROFEN 600 MG/1
600 TABLET ORAL EVERY 6 HOURS
Status: DISCONTINUED | OUTPATIENT
Start: 2023-11-26 | End: 2023-11-28 | Stop reason: HOSPADM

## 2023-11-26 RX ORDER — SUCCINYLCHOLINE CHLORIDE 20 MG/ML
INJECTION INTRAMUSCULAR; INTRAVENOUS AS NEEDED
Status: DISCONTINUED | OUTPATIENT
Start: 2023-11-26 | End: 2023-11-26

## 2023-11-26 RX ORDER — MISOPROSTOL 200 UG/1
800 TABLET ORAL ONCE AS NEEDED
Status: COMPLETED | OUTPATIENT
Start: 2023-11-26 | End: 2023-11-26

## 2023-11-26 RX ORDER — CEFAZOLIN 1 G/1
INJECTION, POWDER, FOR SOLUTION INTRAVENOUS AS NEEDED
Status: DISCONTINUED | OUTPATIENT
Start: 2023-11-26 | End: 2023-11-26

## 2023-11-26 RX ORDER — ONDANSETRON HYDROCHLORIDE 2 MG/ML
4 INJECTION, SOLUTION INTRAVENOUS EVERY 6 HOURS PRN
Status: DISCONTINUED | OUTPATIENT
Start: 2023-11-26 | End: 2023-11-28 | Stop reason: HOSPADM

## 2023-11-26 RX ORDER — HYDRALAZINE HYDROCHLORIDE 20 MG/ML
5 INJECTION INTRAMUSCULAR; INTRAVENOUS ONCE AS NEEDED
Status: DISCONTINUED | OUTPATIENT
Start: 2023-11-26 | End: 2023-11-26

## 2023-11-26 RX ORDER — LABETALOL HYDROCHLORIDE 5 MG/ML
20 INJECTION, SOLUTION INTRAVENOUS ONCE AS NEEDED
Status: DISCONTINUED | OUTPATIENT
Start: 2023-11-26 | End: 2023-11-28 | Stop reason: HOSPADM

## 2023-11-26 RX ORDER — METOCLOPRAMIDE 10 MG/1
10 TABLET ORAL EVERY 6 HOURS PRN
Status: DISCONTINUED | OUTPATIENT
Start: 2023-11-26 | End: 2023-11-26

## 2023-11-26 RX ORDER — LIDOCAINE 560 MG/1
1 PATCH PERCUTANEOUS; TOPICAL; TRANSDERMAL
Status: DISCONTINUED | OUTPATIENT
Start: 2023-11-26 | End: 2023-11-28 | Stop reason: HOSPADM

## 2023-11-26 RX ORDER — HYDROMORPHONE HYDROCHLORIDE 1 MG/ML
0.2 INJECTION, SOLUTION INTRAMUSCULAR; INTRAVENOUS; SUBCUTANEOUS EVERY 5 MIN PRN
Status: CANCELLED | OUTPATIENT
Start: 2023-11-26

## 2023-11-26 RX ORDER — FAMOTIDINE 10 MG/ML
INJECTION INTRAVENOUS AS NEEDED
Status: DISCONTINUED | OUTPATIENT
Start: 2023-11-26 | End: 2023-11-26

## 2023-11-26 RX ORDER — CARBOPROST TROMETHAMINE 250 UG/ML
250 INJECTION, SOLUTION INTRAMUSCULAR ONCE AS NEEDED
Status: DISCONTINUED | OUTPATIENT
Start: 2023-11-26 | End: 2023-11-28 | Stop reason: HOSPADM

## 2023-11-26 RX ORDER — TRANEXAMIC ACID 100 MG/ML
1000 INJECTION, SOLUTION INTRAVENOUS ONCE AS NEEDED
Status: DISCONTINUED | OUTPATIENT
Start: 2023-11-26 | End: 2023-11-28 | Stop reason: HOSPADM

## 2023-11-26 RX ORDER — LOPERAMIDE HYDROCHLORIDE 2 MG/1
4 CAPSULE ORAL EVERY 2 HOUR PRN
Status: DISCONTINUED | OUTPATIENT
Start: 2023-11-26 | End: 2023-11-26

## 2023-11-26 RX ORDER — LOPERAMIDE HYDROCHLORIDE 2 MG/1
4 CAPSULE ORAL EVERY 2 HOUR PRN
Status: DISCONTINUED | OUTPATIENT
Start: 2023-11-26 | End: 2023-11-28 | Stop reason: HOSPADM

## 2023-11-26 RX ORDER — METHYLERGONOVINE MALEATE 0.2 MG/ML
0.2 INJECTION INTRAVENOUS ONCE AS NEEDED
Status: DISCONTINUED | OUTPATIENT
Start: 2023-11-26 | End: 2023-11-26

## 2023-11-26 RX ORDER — LIDOCAINE HYDROCHLORIDE 10 MG/ML
30 INJECTION INFILTRATION; PERINEURAL ONCE AS NEEDED
Status: DISCONTINUED | OUTPATIENT
Start: 2023-11-26 | End: 2023-11-26

## 2023-11-26 RX ORDER — BISACODYL 10 MG/1
10 SUPPOSITORY RECTAL DAILY PRN
Status: DISCONTINUED | OUTPATIENT
Start: 2023-11-26 | End: 2023-11-28 | Stop reason: HOSPADM

## 2023-11-26 RX ORDER — SIMETHICONE 80 MG
80 TABLET,CHEWABLE ORAL 4 TIMES DAILY PRN
Status: DISCONTINUED | OUTPATIENT
Start: 2023-11-26 | End: 2023-11-28 | Stop reason: HOSPADM

## 2023-11-26 RX ORDER — SODIUM CHLORIDE, SODIUM LACTATE, POTASSIUM CHLORIDE, CALCIUM CHLORIDE 600; 310; 30; 20 MG/100ML; MG/100ML; MG/100ML; MG/100ML
125 INJECTION, SOLUTION INTRAVENOUS CONTINUOUS
Status: DISCONTINUED | OUTPATIENT
Start: 2023-11-26 | End: 2023-11-26

## 2023-11-26 RX ORDER — CARBOPROST TROMETHAMINE 250 UG/ML
250 INJECTION, SOLUTION INTRAMUSCULAR ONCE AS NEEDED
Status: COMPLETED | OUTPATIENT
Start: 2023-11-26 | End: 2023-11-26

## 2023-11-26 RX ORDER — OXYTOCIN/0.9 % SODIUM CHLORIDE 30/500 ML
2-30 PLASTIC BAG, INJECTION (ML) INTRAVENOUS CONTINUOUS
Status: DISCONTINUED | OUTPATIENT
Start: 2023-11-26 | End: 2023-11-26

## 2023-11-26 RX ORDER — DIPHENHYDRAMINE HCL 25 MG
25 CAPSULE ORAL EVERY 6 HOURS PRN
Status: DISCONTINUED | OUTPATIENT
Start: 2023-11-26 | End: 2023-11-28 | Stop reason: HOSPADM

## 2023-11-26 RX ORDER — NIFEDIPINE 10 MG/1
10 CAPSULE ORAL ONCE AS NEEDED
Status: DISCONTINUED | OUTPATIENT
Start: 2023-11-26 | End: 2023-11-28 | Stop reason: HOSPADM

## 2023-11-26 RX ORDER — LIDOCAINE HYDROCHLORIDE 10 MG/ML
0.5 INJECTION INFILTRATION; PERINEURAL ONCE AS NEEDED
Status: DISCONTINUED | OUTPATIENT
Start: 2023-11-26 | End: 2023-11-26

## 2023-11-26 RX ORDER — HYDRALAZINE HYDROCHLORIDE 20 MG/ML
5 INJECTION INTRAMUSCULAR; INTRAVENOUS ONCE AS NEEDED
Status: DISCONTINUED | OUTPATIENT
Start: 2023-11-26 | End: 2023-11-28 | Stop reason: HOSPADM

## 2023-11-26 RX ADMIN — IBUPROFEN 600 MG: 600 TABLET, FILM COATED ORAL at 18:27

## 2023-11-26 RX ADMIN — PROPOFOL 100 MG: 10 INJECTION, EMULSION INTRAVENOUS at 11:32

## 2023-11-26 RX ADMIN — CARBOPROST TROMETHAMINE 250 MCG: 250 INJECTION, SOLUTION INTRAMUSCULAR at 11:29

## 2023-11-26 RX ADMIN — SODIUM CHLORIDE, POTASSIUM CHLORIDE, SODIUM LACTATE AND CALCIUM CHLORIDE 125 ML/HR: 600; 310; 30; 20 INJECTION, SOLUTION INTRAVENOUS at 03:35

## 2023-11-26 RX ADMIN — ONDANSETRON 4 MG: 2 INJECTION, SOLUTION INTRAMUSCULAR; INTRAVENOUS at 11:03

## 2023-11-26 RX ADMIN — PENICILLIN G POTASSIUM 5 MILLION UNITS: 5000000 INJECTION, POWDER, FOR SOLUTION INTRAMUSCULAR; INTRAVENOUS at 03:37

## 2023-11-26 RX ADMIN — SODIUM CHLORIDE, POTASSIUM CHLORIDE, SODIUM LACTATE AND CALCIUM CHLORIDE 1000 ML: 600; 310; 30; 20 INJECTION, SOLUTION INTRAVENOUS at 03:36

## 2023-11-26 RX ADMIN — MISOPROSTOL 800 MCG: 200 TABLET ORAL at 11:34

## 2023-11-26 RX ADMIN — SODIUM CHLORIDE, SODIUM LACTATE, POTASSIUM CHLORIDE, AND CALCIUM CHLORIDE: 600; 310; 30; 20 INJECTION, SOLUTION INTRAVENOUS at 11:07

## 2023-11-26 RX ADMIN — FAMOTIDINE 10 MG: 10 INJECTION, SOLUTION INTRAVENOUS at 11:07

## 2023-11-26 RX ADMIN — ACETAMINOPHEN 975 MG: 325 TABLET ORAL at 18:27

## 2023-11-26 RX ADMIN — CEFAZOLIN SODIUM 2 G: 2 INJECTION, SOLUTION INTRAVENOUS at 17:38

## 2023-11-26 RX ADMIN — METOCLOPRAMIDE 10 MG: 5 INJECTION, SOLUTION INTRAMUSCULAR; INTRAVENOUS at 12:25

## 2023-11-26 RX ADMIN — SUCCINYLCHOLINE CHLORIDE 160 MG: 20 INJECTION, SOLUTION INTRAMUSCULAR; INTRAVENOUS at 11:32

## 2023-11-26 RX ADMIN — MORPHINE SULFATE 2 MG: 2 INJECTION, SOLUTION INTRAMUSCULAR; INTRAVENOUS at 10:29

## 2023-11-26 RX ADMIN — TRANEXAMIC ACID 1000 MG: 100 INJECTION, SOLUTION INTRAVENOUS at 11:33

## 2023-11-26 RX ADMIN — Medication: at 09:18

## 2023-11-26 RX ADMIN — PENICILLIN G 3 MILLION UNITS: 3000000 INJECTION, SOLUTION INTRAVENOUS at 07:32

## 2023-11-26 RX ADMIN — Medication 2 MILLI-UNITS/MIN: at 05:55

## 2023-11-26 RX ADMIN — CEFAZOLIN 2 G: 330 INJECTION, POWDER, FOR SOLUTION INTRAMUSCULAR; INTRAVENOUS at 11:36

## 2023-11-26 RX ADMIN — METRONIDAZOLE 500 MG: 500 TABLET ORAL at 17:37

## 2023-11-26 SDOH — SOCIAL STABILITY: SOCIAL INSECURITY: ARE THERE ANY APPARENT SIGNS OF INJURIES/BEHAVIORS THAT COULD BE RELATED TO ABUSE/NEGLECT?: NO

## 2023-11-26 SDOH — SOCIAL STABILITY: SOCIAL INSECURITY: PHYSICAL ABUSE: DENIES

## 2023-11-26 SDOH — HEALTH STABILITY: MENTAL HEALTH: HAVE YOU USED ANY PRESCRIPTION DRUGS OTHER THAN PRESCRIBED IN THE PAST 12 MONTHS?: NO

## 2023-11-26 SDOH — HEALTH STABILITY: MENTAL HEALTH: SUICIDAL BEHAVIOR (LIFETIME): NO

## 2023-11-26 SDOH — SOCIAL STABILITY: SOCIAL INSECURITY: ABUSE SCREEN: ADULT

## 2023-11-26 SDOH — HEALTH STABILITY: MENTAL HEALTH: NON-SPECIFIC ACTIVE SUICIDAL THOUGHTS (PAST 1 MONTH): NO

## 2023-11-26 SDOH — SOCIAL STABILITY: SOCIAL INSECURITY: DOES ANYONE TRY TO KEEP YOU FROM HAVING/CONTACTING OTHER FRIENDS OR DOING THINGS OUTSIDE YOUR HOME?: NO

## 2023-11-26 SDOH — HEALTH STABILITY: MENTAL HEALTH: WISH TO BE DEAD (PAST 1 MONTH): NO

## 2023-11-26 SDOH — SOCIAL STABILITY: SOCIAL INSECURITY: DO YOU FEEL ANYONE HAS EXPLOITED OR TAKEN ADVANTAGE OF YOU FINANCIALLY OR OF YOUR PERSONAL PROPERTY?: NO

## 2023-11-26 SDOH — ECONOMIC STABILITY: HOUSING INSECURITY: DO YOU FEEL UNSAFE GOING BACK TO THE PLACE WHERE YOU ARE LIVING?: NO

## 2023-11-26 SDOH — HEALTH STABILITY: MENTAL HEALTH: CURRENT SMOKER: 0

## 2023-11-26 SDOH — SOCIAL STABILITY: SOCIAL INSECURITY: HAVE YOU HAD THOUGHTS OF HARMING ANYONE ELSE?: NO

## 2023-11-26 SDOH — SOCIAL STABILITY: SOCIAL INSECURITY: HAS ANYONE EVER THREATENED TO HURT YOUR FAMILY OR YOUR PETS?: NO

## 2023-11-26 SDOH — HEALTH STABILITY: MENTAL HEALTH: WERE YOU ABLE TO COMPLETE ALL THE BEHAVIORAL HEALTH SCREENINGS?: YES

## 2023-11-26 SDOH — HEALTH STABILITY: MENTAL HEALTH: HAVE YOU USED ANY SUBSTANCES (CANABIS, COCAINE, HEROIN, HALLUCINOGENS, INHALANTS, ETC.) IN THE PAST 12 MONTHS?: NO

## 2023-11-26 SDOH — SOCIAL STABILITY: SOCIAL INSECURITY: ARE YOU OR HAVE YOU BEEN THREATENED OR ABUSED PHYSICALLY, EMOTIONALLY, OR SEXUALLY BY ANYONE?: NO

## 2023-11-26 SDOH — SOCIAL STABILITY: SOCIAL INSECURITY: VERBAL ABUSE: DENIES

## 2023-11-26 ASSESSMENT — PAIN SCALES - GENERAL
PAIN_LEVEL: 0
PAINLEVEL_OUTOF10: 0 - NO PAIN
PAINLEVEL_OUTOF10: 6
PAINLEVEL_OUTOF10: 0 - NO PAIN
PAINLEVEL_OUTOF10: 0 - NO PAIN

## 2023-11-26 ASSESSMENT — LIFESTYLE VARIABLES
HOW OFTEN DO YOU HAVE A DRINK CONTAINING ALCOHOL: NEVER
HOW MANY STANDARD DRINKS CONTAINING ALCOHOL DO YOU HAVE ON A TYPICAL DAY: PATIENT DOES NOT DRINK
AUDIT-C TOTAL SCORE: 0
SKIP TO QUESTIONS 9-10: 1
AUDIT-C TOTAL SCORE: 0
HOW OFTEN DO YOU HAVE 6 OR MORE DRINKS ON ONE OCCASION: NEVER

## 2023-11-26 ASSESSMENT — PATIENT HEALTH QUESTIONNAIRE - PHQ9
1. LITTLE INTEREST OR PLEASURE IN DOING THINGS: NOT AT ALL
2. FEELING DOWN, DEPRESSED OR HOPELESS: NOT AT ALL
SUM OF ALL RESPONSES TO PHQ9 QUESTIONS 1 & 2: 0

## 2023-11-26 ASSESSMENT — ACTIVITIES OF DAILY LIVING (ADL): LACK_OF_TRANSPORTATION: NO

## 2023-11-26 NOTE — ANESTHESIA POSTPROCEDURE EVALUATION
Patient: Iemlda Mendoza    Procedure Summary       Date: 11/26/23 Room / Location:     Anesthesia Start: 1105 Anesthesia Stop:     Procedure: Procedure Not Yet Scheduled Diagnosis:     Scheduled Providers:  Responsible Provider: Migue Velasquez DO    Anesthesia Type: general ASA Status: 2 - Emergent            Anesthesia Type: general    Vitals Value Taken Time   /65 11/26/23 1219   Temp 36 11/26/23 1222   Pulse 103 11/26/23 1219   Resp 14 11/26/23 1222   SpO2 100 % 11/26/23 1218       Anesthesia Post Evaluation    Patient location during evaluation: bedside  Patient participation: complete - patient participated  Level of consciousness: awake and alert  Pain score: 0  Pain management: adequate  Multimodal analgesia pain management approach  Airway patency: patent  Cardiovascular status: acceptable and blood pressure returned to baseline  Respiratory status: acceptable  Hydration status: acceptable  Postoperative Nausea and Vomiting: none        No notable events documented.

## 2023-11-26 NOTE — SIGNIFICANT EVENT
"Labor Check      S: Patient amenable to AROM        O:  /72   Pulse 97   Temp 36.8 °C (98.2 °F) (Temporal)   Resp 18   Ht 1.626 m (5' 4\")   Wt 70 kg (154 lb 5.2 oz)   LMP 02/24/2023   SpO2 99%   BMI 26.49 kg/m²      SVE: 3/50/-2     NST  Baseline: 130  Variability: moderate  Accels: +  Decels: -    [place]: q4 min         A/P  -  Latent Labor.   - CEFM Cat I currently  - S/p AROM  - Will start Pit. Increase per protocol  - Continue position changes, as tolerated  - cHTN- currently normotensive, asx  - Adm Hgb 9.6. Declines all blood products. Consent in chart; anesthesia aware   - GBS positive, receiving PCN ppx   - Recheck as clinically indicated by maternal or fetal status or PRN   - Anticipate NSVB.      Pt. d/w Risa Butt and Jenaro DANIELSON. Daisy Broderick MD  PGY-1, Obstetrics & Gynecology   [facility]       "

## 2023-11-26 NOTE — ANESTHESIA PREPROCEDURE EVALUATION
Patient: Imelda Mendoza    Evaluation Method: In-person visit    Procedure Information    Date: 23  Procedure: Labor Consult         Relevant Problems   Anesthesia  Denies family hx of anesthesia complications   No history of complications History of general anesthesia      Cardiovascular   (+) HTN (hypertension) (Per EMR, chronic and not on medication. )      GI   No history of complications Gastroesophageal reflux disease      /Renal (within normal limits)      GI/Hepatic (within normal limits)      Hematology   (+) Iron deficiency anemia      Musculoskeletal   No history of complications Chronic low back pain   No history of complications Chronic neck pain       Clinical information reviewed:   Tobacco  Allergies  Meds      Fam Hx          NPO Detail:  NPO/Void Status  Date of Last Liquid: 23  Date of Last Solid: 23  Time of Last Solid:          OB/Gyn Evaluation    Present Pregnancy    Patient is pregnant now.  (-) previous  delivery     Obstetric History                Physical Exam    Airway  Mallampati: I  TM distance: >3 FB  Neck ROM: full     Cardiovascular   Rhythm: regular     Dental    Pulmonary   Breath sounds clear to auscultation     Abdominal   (-) obese             Anesthesia Plan    ASA 2     other   (Patient declines epidurals. Explained to patient at length that in an emergency, we would need to do general anesthesia and she verbalized understanding. Risks of general anesthesia discussed. )  Anesthetic plan and risks discussed with patient.  Use of blood products discussed with patient who did not consent to blood products.  Blood Products Consent Comment: Patient declines blood, plasma, platelets, albumin, and all products at this time. She reports that in an emergency she may change her mind but at this time she declines any products. Patient DOES ACCEPT CELL SAVER.   Plan discussed with attending.

## 2023-11-26 NOTE — SIGNIFICANT EVENT
Labor  - SVE 5.5/80/-2 -> anticipate transitioning to active labor   - continue Pit per protocol   - /mod/+accel/-decel, ctx q 2 min   - cat 1 FHT, CEFM    Anemia  - continues to decline blood products, accepts cell saver  - Hgb 9. 6, discussed r/b/a    Willa Abdalla MD

## 2023-11-26 NOTE — ANESTHESIA PROCEDURE NOTES
Airway  Date/Time: 11/26/2023 11:33 AM  Urgency: emergent    Airway not difficult    Staffing  Performed: resident   Authorized by: Migue Velasquez DO    Performed by: Rodríguez Boogie MD  Patient location during procedure: OR    Consent for Airway (if performed for an anesthetic, see related documentation for consents)  Patient identity confirmed: verbally with patient  Consent: No emergent situation. Verbal consent obtained.  Consent given by: patient      Indications and Patient Condition  Indications for airway management: anesthesia and cardiovascular instability  Spontaneous ventilation: present  Sedation level: deep  Preoxygenated: yes  Patient position: sniffing  Mask difficulty assessment: 0 - not attempted  Planned trial extubation    Final Airway Details  Final airway type: endotracheal airway      Successful airway: ETT  Cuffed: yes   Successful intubation technique: direct laryngoscopy  Facilitating devices/methods: intubating stylet and cricoid pressure  Endotracheal tube insertion site: oral  Blade: [place]gr[place]  Blade size: #3  ETT size (mm): 6.5  Cormack-Lehane Classification: grade IIa - partial view of glottis  Placement verified by: chest auscultation and capnometry   Measured from: teeth  ETT to teeth (cm): 22  Number of attempts at approach: 1  Number of other approaches attempted: 0

## 2023-11-26 NOTE — CARE PLAN
Problem: Vaginal Birth or  Section  Goal: Fetal and maternal status remain reassuring during the birth process  Outcome: Met  Goal: Demonstrates labor coping techniques through delivery  Outcome: Progressing  Goal: Minimal s/sx of HDP and BP<160/110  Outcome: Met

## 2023-11-26 NOTE — SIGNIFICANT EVENT
House officer to bedside following delivery. Placenta in site, membranes and cord evacuated however placenta confirmed at fundus by BSUS. Given IV Morphine and attempted manual placenta removal, however patient unable to tolerate. Readdressed blood product status, patient would now accept blood transfusion to save her life. Will plan to obtain regional anesthesia for EUA, placenta removal, D&C. Pt amenable. Pitocin paused.    D/w Dr. Aaron Abdalla MD

## 2023-11-26 NOTE — ANESTHESIA PROCEDURE NOTES
Peripheral IV  Date/Time: 11/26/2023 11:40 AM      Placement  Needle size: 16 G  Laterality: left  Location: wrist  Local anesthetic: none  Site prep: chlorhexidine  Technique: anatomical landmarks  Attempts: 1

## 2023-11-26 NOTE — L&D DELIVERY NOTE
OB Delivery Note  2023  Imelda Mendoza  28 y.o.   Vaginal, Spontaneous        Gestational Age: 38w5d  /Para:   Quantitative Blood Loss: Admission to Discharge: 100 mL (2023 12:32 AM - 2023  1:40 PM)    Abilio Mendoza [[id number]]      Labor Events    Rupture date/time: 2023 0447  Rupture type: Artificial  Fluid color: Clear  Fluid odor: None  Labor type: Induced Onset of Labor  Labor allowed to proceed with plans for an attempted vaginal birth?: Yes  Induction: Oxytocin, AROM  Complications: None       Labor Event Times    Labor onset date/time: 2023 0032  Dilation complete date/time: 2023 1004  Start pushing date/time: 2023 100       Labor Length    1st stage: 9h 32m  2nd stage: 0h 05m  3rd stage: 1h 21m       Placenta    Placenta delivery date/time: 2023 1130  Placenta removal: Manual removal  Placenta appearance: Abnormal  Placenta disposition: pathology       Cord    Vessels: 3 vessels  Complications: None  Delayed cord clamping?: Yes  Cord clamped date/time: 2023 1010  Cord blood disposition: Lab  Gases sent?: No       Lacerations    Episiotomy: None       Anesthesia    Method: None       Operative Delivery    Forceps attempted?: No  Vacuum extractor attempted?: No       Shoulder Dystocia    Shoulder dystocia present?: No        Delivery    Birth date/time: 2023 10:09:00  Delivery type: Vaginal, Spontaneous  Complications: None       Resuscitation    Method: None       Apgars    Living status: Living  Apgar Component Scores:  1 min.:  5 min.:  10 min.:  15 min.:  20 min.:    Skin color:  1  1       Heart rate:  2  2       Reflex irritability:  2  2       Muscle tone:  2  2       Respiratory effort:  2  2       Total:  9  9       Apgars assigned by: ZAN FLANAGAN       Delivery Providers    Delivering clinician: MARIMAR Henley   Provider Role    Yoselin García, RN Delivery Nurse    Randee Flanagan RN Nursery Nurse     Angela Miranda MD Resident    MD Gayathri Weber, APRN-MARQUISM

## 2023-11-26 NOTE — ANESTHESIA PREPROCEDURE EVALUATION
Patient: Imelda Mendoza    Evaluation Method: In-person visit    Procedure Information    Date: 11/26/23  Procedure: Labor Analgesia         Relevant Problems   Anesthesia (within normal limits)      Cardiovascular   (+) HTN (hypertension)      Endocrine (within normal limits)      GI (within normal limits)      /Renal (within normal limits)      Neuro/Psych (within normal limits)      Pulmonary (within normal limits)      GI/Hepatic (within normal limits)      Hematology   (+) Iron deficiency anemia      Musculoskeletal (within normal limits)      Eyes, Ears, Nose, and Throat (within normal limits)      Infectious Disease (within normal limits)       Clinical information reviewed:   Tobacco  Allergies  Meds      Fam Hx          NPO Detail:  NPO/Void Status  Date of Last Liquid: 11/26/23  Date of Last Solid: 11/25/23  Time of Last Solid: 2200         OB/Gyn Evaluation    Present Pregnancy    Patient is pregnant now.  (+) , retained placenta   Obstetric History                Physical Exam    Airway  Mallampati: II  TM distance: <3 FB  Neck ROM: full     Cardiovascular   Rhythm: regular  Rate: normal     Dental    Pulmonary   Breath sounds clear to auscultation     Abdominal            Anesthesia Plan    ASA 2 - emergent     CSE     The patient is not a current smoker.    Postoperative administration of opioids is intended.  Anesthetic plan and risks discussed with patient.  Use of blood products discussed with patient who.    Plan discussed with attending.

## 2023-11-26 NOTE — ANESTHESIA PREPROCEDURE EVALUATION
Patient: mIelda Mendoza    Evaluation Method: In-person visit    Procedure Information    Anesthesia Start Date/Time: 11/26/23 1141  Procedure: Procedure Not Yet Scheduled         Relevant Problems   Cardiovascular   (+) HTN (hypertension)      Hematology   (+) Iron deficiency anemia       Clinical information reviewed:   Tobacco  Allergies  Meds      Fam Hx          NPO Detail:  NPO/Void Status  Date of Last Liquid: 11/26/23  Date of Last Solid: 11/25/23  Time of Last Solid: 2200         OB/Gyn Evaluation    Present Pregnancy    (+) , retained placenta   Obstetric History                Physical Exam    Airway  Mallampati: II  TM distance: <3 FB  Neck ROM: full     Cardiovascular   Rhythm: regular  Rate: normal     Dental    Pulmonary   Breath sounds clear to auscultation     Abdominal            Anesthesia Plan    ASA 2 - emergent     general     The patient is not a current smoker.    intravenous induction   Postoperative administration of opioids is intended.  Trial extubation is planned.  Anesthetic plan and risks discussed with patient.  Use of blood products discussed with patient who.    Plan discussed with attending.

## 2023-11-26 NOTE — PROCEDURES
Date: 2023  OR Location: [facility] OB    Name: Imelda Mendoza, : 1995, Age: 28 y.o., MRN: [id number], Sex: female    Diagnosis  Pre-op Diagnosis     * Retained complete placenta [O73.0] Post-op Diagnosis     * Retained complete placenta [O73.0]     Procedures  Extraction Retained Placenta  68464 - WI DELIVERY PLACENTA SEPARATE PROCEDURE    Surgeons      * Debby Walker - Primary    Resident/Fellow/Other Assistant:  Surgeon(s) and Role:     * Willa Abdalla MD - Resident - Assisting    Procedure Summary  Anesthesia: General  ASA: II  Anesthesia Staff: Anesthesiologist: Migue Velasquez DO  Anesthesia Resident: Rodríguez Boogie MD  Estimated Blood Loss: 1400 mL  Intra-op Medications:   Medication Name Total Dose   lactated Ringer's infusion 1,056.25 mL   nitrous oxide - oxygen therapy Cannot be calculated   oxytocin (Pitocin) infusion in sodium chloride 0.9% 30 units/500 mL 1,076 silverio-units   carboprost (Hemabate) injection 250 mcg 250 mcg   miSOPROStoL (Cytotec) tablet 800 mcg 800 mcg   morphine injection 2 mg 2 mg   oxytocin (Pitocin) bolus from bag 18,600 silverio-units   tranexamic acid (Cyklokapron) injection 1,000 mg 1,000 mg            Anesthesia Record               Intraprocedure I/O Totals          Intake    .00 mL    Propofol Drip 10.00 mL    The total shown is the total volume documented since Anesthesia Start was filed.    Total Intake 810 mL       Output    Total Blood Loss - Surgical Delivery (mL) 1400 mL    Total Output 1400 mL       Net    Net Volume 710 mL       Other (could not be determined as input or output)    Surgical Delivery Estimated Blood Loss (mL) 1400          Specimen: No specimens collected     Staff:   Scrub Person: Jagruti Johnson    Indication: Patient brought to the OR in the s/o retained placenta. Underwent urgent EUA and placenta removal under GA given high volume, rapid hemorrhage.     Patient was previously declining blood products, however prior to  being brought back to the OR, she was amenable to blood products and re-consented. Pt was taken to the OR where she was placed in lithotomy position. Attempt made to achieve regional anesthesia however patient rapidly lost ~1 L of blood. Decision made to proceed with general anesthesia. General anesthesia established. Given acuity, patient was not prepped and draped in a sterile fashion. A dose of Ancef was administered. TXA and Hemabate was administered.    An exam under anesthesia revealed placenta in situ at fundus. Placenta removed in segments. After thorough exam, remainder of placenta in a large piece. Bleeding improved. BSUS demonstrated thin endometrial stripe. Rectal cytotec administered. Decision made to transfuse 1 U pRBC given patient pressor requirement and blood loss. All counts were correct, the patient tolerated the procedure well.  Dr. Walker was present for the entire procedure.  The patient was taken to L&D in stable condition after reversal of anesthesia. Given total EBL of 1500 including delivery and inability to perform procedure in a sterile fashion, an additional dose of Ancef and a dose of Flagyl is to be administered post procedurally.

## 2023-11-26 NOTE — H&P
Obstetrical Admission History and Physical    Chief Complaint: Pregnancy Problem and Contractions    Assessment/Plan    Imelda Mendoza is a 28 y.o. 38w5d presenting to OB triage for contractions and back pain; now admitted for IOL secondary to persistent fetal tachycardia.     1. Labor management   - Admit to L&D, consented and scanned- cephalic   - Will induce with Pitocin and AROM when appropriate   - Delivery plan: patient desires vaginal delivery, patient counseled on risks of labor, vaginal delivery, and possibility of C/S for varying maternal or fetal indications    2. Fetal Tachycardia   - CEFM with a baseline in the 170s-190s  - Maternal VSS; afebrile   - Patient denies URI sx, sick contacts   - Will collect CBC, UA to r/o maternal infection   - Cannot r/o fetal distress; will induce given term gestation, as above     3. cHTN   - dx'd prior to previous pregnancies   - No meds  - Currently normotensive; asx     4. Maternal Well- being   - GBS bacteriuria - for PCN ppx  - Desires NCB; nubain/epidural per patient request  - ppBC: patient states she previously signed consents in the office; unable to locate in EMR. Consents resigned this admission, . Scanned in chart.   - Desires bridge with POPs     5. Fetal well-being  - CEFM cat II due to tachycardia   - EFW 3116g (7#)    To be seen & dw. Dr. Filomena Broderick MD  PGY-1, Obstetrics & Gynecology   [facility]       Principal Problem:    Normal labor      Imelda Mendoza Problems (from 05/10/23 to present)       Problem Noted Resolved    Supervision of high risk pregnancy in third trimester 2023 by Madina Chaudhry MD No    Priority:  Medium      Overview Addendum 2023  5:12 PM by Madina Chaudhry MD     Datin wk US  [x] Prenatal Labs  [x] 1hr GCT at 24-28wks: 11/10  [x] Tdap (27-36wks): declined, readdress at next visit   [x] Flu Shot: declined   [x] COVID vaccine: declined   [x] GBS at 36 wks: GBS+  UTI  [] Breastfeeding  [] Postpartum Birth control method:   [] 39 weeks discussion of IOL vs. Expectant management:  [] Mode of delivery:           Iron deficiency anemia 2023 by MARIMAR Weathers No    Priority:  Medium      Overview Signed 2023  5:10 PM by Madina Chaudhry MD     - Continue PO iron  - For IV iron if unresponsive to PO, recheck at next visit         Marginal insertion of umbilical cord affecting management of mother 2023 by MARIMAR Weathers No    Priority:  Medium      Limited prenatal care in third trimester 2023 by MARIMAR Weathers No    Priority:  Medium              Subjective   Imelda Mendoza is a 28 y.o.  at 38w5d. ZAN: 2023, by Ultrasound. Estimated fetal weight: 3116g. She has had prenatal care with [facility]. She presented to OB triage for contractions and back pain; now admitted for IOL secondary to persistent fetal tachycardia.     Patient states that she believes she lost her mucus plug tonight around 11pm. She subsequently began to experience contractions, back pain, and rectal pressure. She denies VB or LOF. Reports active FM.     Pregnancy notables:   - GBS bacteruria   - Anemia- on PO Fe     Reason for Induction of Labor:  Compromised fetal status with current fetal monitoring    Obstetrical History   OB History    Para Term  AB Living   4 3 1 2   3   SAB IAB Ectopic Multiple Live Births           3      # Outcome Date GA Lbr Roman/2nd Weight Sex Delivery Anes PTL Lv   4 Current            3  10/12/20 36w0d  2722 g F Vag-Spont  Y RAY   2  18 31w2d  1814 g M Vag-Spont EPI  RAY   1 Term 17 39w4d  3175 g  Vag-Spont  N RAY      Obstetric Comments   Pt has a history of precipitous  delivery at home at 31 and 36 weeks  05/10/2023 03:44 PM - VL        MFM referral placed   05/10/2023 03:44 PM - VL [id number] false     [id number] DNP Unable to be reached by MFM - phone number not working (see  comments section)  05/10/2023 03:45 PM - VL        Pt ins out of network - is she aware?   05/10/2023 03:45 PM - VL [id number] false     [id number] DNP Anxiety  05/10/2023 10:17 AM - EI     [id number] DNP History of hypertension  2020 04:52 PM - AER        basline HELLP labs ordered 5/10   05/10/2023 03:47 PM - VL [id number] false     [id number] DNP History of  delivery  2023 12:43 PM - AM     [id number] DNP Prenatal care, subsequent pregnancy in second trimester  2023 12:43 PM - AM     [id number] DNP Supervision of pregnancy with insufficient  care in second trimester  2023 12:43 PM - AM     [id number] DNP US from  - lower uterine synechiae seen with absent flow  2023 10:33 PM - VL        re-evaluate in 3rd TM - see note from US 2023 10:34 PM - VL [id number] false     [id number] DNP UTI in pregnancy  08/10/2023 03:49 PM - AM         neg CADY   2023 11:08 PM - VD [id number] false        CADY had contaminant --> needs urine cx @ next visit   2023 12:45 PM - AM         Past Medical History  Past Medical History:   Diagnosis Date    15 weeks gestation of pregnancy 2020    15 weeks gestation of pregnancy    Abnormal Pap smear of cervix     LSIL 2017, ASCUS-2018    Abnormal weight loss 10/13/2022    Unintentional weight loss    Anxiety disorder, unspecified 2022    Anxiety    Encounter for  screening for Streptococcus B 09/15/2020     screening for streptococcus B    Encounter for contraceptive management, unspecified 2019    Encounter for birth control    Encounter for gynecological examination (general) (routine) without abnormal findings 2022    Well woman exam    Encounter for pregnancy test, result positive 2020    Positive urine pregnancy test    Encounter for screening for diabetes mellitus 2020    Screening for diabetes mellitus (DM)    Encounter for screening for infections with a predominantly sexual mode of  transmission 05/01/2020    Routine screening for STI (sexually transmitted infection)    Encounter for screening for infections with a predominantly sexual mode of transmission 09/15/2020    Routine screening for STI (sexually transmitted infection)    Encounter for screening for malignant neoplasm of cervix 06/18/2018    Encounter for Papanicolaou smear for cervical cancer screening    Encounter for supervision of normal pregnancy, unspecified, unspecified trimester 05/01/2020    Encounter for prenatal care    Gestational hypertension 2017    Other problems related to lifestyle     Other problems related to lifestyle    Personal history of other complications of pregnancy, childbirth and the puerperium 05/01/2020    History of gestational hypertension    Personal history of other specified conditions 09/24/2020    History of headache    Personal history of other specified conditions 06/18/2018    History of urinary frequency    Supervision of pregnancy with grand multiparity, third trimester 10/04/2020    High risk multigravida in third trimester        Past Surgical History   No past surgical history on file.    Social History  Social History     Tobacco Use    Smoking status: Never     Passive exposure: Never    Smokeless tobacco: Never   Substance Use Topics    Alcohol use: Never     Substance and Sexual Activity   Drug Use Never       Allergies  Patient has no known allergies.     Medications  Medications Prior to Admission   Medication Sig Dispense Refill Last Dose    aspirin 81 mg chewable tablet CHEW 2 TABLETS BY MOUTH ONCE DAILY BEGIN TAKING AT 12 WEEKS AS DIRECTED 60 tablet 10     docusate sodium (Colace) 100 mg capsule Take 1 capsule (100 mg) by mouth 2 times a day as needed for constipation. 60 capsule 5     ferrous sulfate, 325 mg ferrous sulfate, tablet Take 1 tablet (325 mg) by mouth 2 times a day. Every other day 30 tablet 1     prenatal vit,calc76-iron-folic (Prenatabs Rx) 29 mg iron- 1 mg tablet  TAKE 1 TABLET BY MOUTH ONCE DAILY 30 tablet 11        Objective    Last Vitals  Temp Pulse Resp BP MAP O2 Sat   37 °C (98.6 °F) 85 16 117/71   100 %     Physical Examination  Genitourinary:      Vulva normal.   Cardiovascular:      Rate and Rhythm: Normal rate.   Pulmonary:      Effort: Pulmonary effort is normal.      Breath sounds: Normal breath sounds.   Abdominal:      Palpations: Abdomen is soft.      Comments: Gravid, non-tender to palpation  Neurological:      General: No focal deficit present.      Mental Status: She is alert.   Skin:     General: Skin is warm and dry.   Psychiatric:         Mood and Affect: Mood normal.         Behavior: Behavior normal.         NST  Baseline: 170  Variability: moderate  Accels: +  Decels: -    SVE: 2/50/-2    Lab Review  Labs in chart were reviewed.

## 2023-11-26 NOTE — SIGNIFICANT EVENT
Refusal of blood products discussion:  At bedside with Dr. Butt to discuss refusal of blood products and sign consent form. Inquired as to Zoroastrianism/ cultural/spiritual etiology of declination; pt reports she is unsure but is adamant in her declination of all blood products. We reviewed all components of the blood refusal consent form and she declines all. Confirmed again that in a life threatening emergency she would decline all blood products even if it meant death. She confirmed this desire. We discussed that she can always change her wishes as long as she is able to consent for herself. Would accept IV Iron only.     Randee Eason MD

## 2023-11-26 NOTE — PROGRESS NOTES
Intrapartum Progress Note    Assessment/Plan   Imelda Mendoza is a 28 y.o. 38w5d presenting to OB triage for contractions and back pain; now admitted for IOL secondary to persistent fetal tachycardia.      Labor management   - s/p AROM  - Continue to titrate pitocin per protocol, currently 4  - Progressed to 5 cm on last SVE, anticipate transition to active phase  - Would like to try Nitrous Oxide for pain management  - GBS bacteriuria - on PCN ppx     cHTN   - dx'd prior to previous pregnancies   - No meds  - Currently normotensive; asx      Maternal Well- being   - ppBC: patient states she previously signed consents in the office; unable to locate in EMR. Consents resigned this admission, 11/26. Scanned in chart.   - Desires bridge with POPs      Fetal well-being  - fetal tachycardia on admission, now resolved. CBC, UA not suggestive of maternal infection. Maternal VSS.  - CEFM cat II with occasional decels, however, overall reassuring  - EFW 3116g (7#)    Subjective   Feeling increased pressure with contractions. Would like something for pain management    Objective   Last Vitals:  Temp Pulse Resp BP MAP Pulse Ox   36.5 °C (97.7 °F) (!) 128 18 134/64   100 %     Vitals Min/Max Last 24 Hours:  Temp  Min: 36.4 °C (97.5 °F)  Max: 37 °C (98.6 °F)  Pulse  Min: 25  Max: 135  Resp  Min: 16  Max: 18  BP  Min: 116/61  Max: 150/80    Intake/Output:  No intake or output data in the 24 hours ending 11/26/23 0951    Physical Examination:  GENERAL: Examination reveals a well developed, well nourished, gravid female in no acute distress. She is alert and cooperative.  HEENT: PERRLA. External ears normal.  LUNGS: No increased work of breathing on room air  HEART: Warm and well-perfused  EXTREMITIES: Full ROM  NEUROLOGICAL: alert, oriented, normal speech, no focal findings or movement disorder noted    FHT: 140 bpm, moderate variability, + accels, occasional late deceleration  SVE: 5/80/-2    Lab Review:  Lab Results  "  Component Value Date    WBC 9.5 11/26/2023    HGB 9.6 (L) 11/26/2023    HCT 30.0 (L) 11/26/2023     11/26/2023     No results found for: \"GLUCOSE\", \"NA\", \"K\", \"CL\", \"CO2\", \"ANIONGAP\", \"BUN\", \"CREATININE\", \"EGFR\", \"CALCIUM\", \"ALBUMIN\", \"PROT\", \"ALKPHOS\", \"ALT\", \"AST\", \"BILITOT\"  No results found for: \"UTPCR\"  "

## 2023-11-27 ENCOUNTER — APPOINTMENT (OUTPATIENT)
Dept: OBSTETRICS AND GYNECOLOGY | Facility: CLINIC | Age: 28
End: 2023-11-27
Payer: COMMERCIAL

## 2023-11-27 LAB
BLOOD EXPIRATION DATE: NORMAL
BLOOD EXPIRATION DATE: NORMAL
DISPENSE STATUS: NORMAL
DISPENSE STATUS: NORMAL
ERYTHROCYTE [DISTWIDTH] IN BLOOD BY AUTOMATED COUNT: 14.4 % (ref 11.5–14.5)
ERYTHROCYTE [DISTWIDTH] IN BLOOD BY AUTOMATED COUNT: 14.9 % (ref 11.5–14.5)
HCT VFR BLD AUTO: 22.1 % (ref 36–46)
HCT VFR BLD AUTO: 27.2 % (ref 36–46)
HGB BLD-MCNC: 7.1 G/DL (ref 12–16)
HGB BLD-MCNC: 8.9 G/DL (ref 12–16)
HOLD SPECIMEN: NORMAL
MCH RBC QN AUTO: 28.2 PG (ref 26–34)
MCH RBC QN AUTO: 29.6 PG (ref 26–34)
MCHC RBC AUTO-ENTMCNC: 32.1 G/DL (ref 32–36)
MCHC RBC AUTO-ENTMCNC: 32.7 G/DL (ref 32–36)
MCV RBC AUTO: 88 FL (ref 80–100)
MCV RBC AUTO: 90 FL (ref 80–100)
NRBC BLD-RTO: 0 /100 WBCS (ref 0–0)
NRBC BLD-RTO: 0 /100 WBCS (ref 0–0)
PLATELET # BLD AUTO: 149 X10*3/UL (ref 150–450)
PLATELET # BLD AUTO: 170 X10*3/UL (ref 150–450)
PRODUCT BLOOD TYPE: 600
PRODUCT BLOOD TYPE: 6200
PRODUCT CODE: NORMAL
PRODUCT CODE: NORMAL
RBC # BLD AUTO: 2.52 X10*6/UL (ref 4–5.2)
RBC # BLD AUTO: 3.01 X10*6/UL (ref 4–5.2)
UNIT ABO: NORMAL
UNIT ABO: NORMAL
UNIT NUMBER: NORMAL
UNIT NUMBER: NORMAL
UNIT RH: NORMAL
UNIT RH: NORMAL
UNIT VOLUME: 276
UNIT VOLUME: 309
WBC # BLD AUTO: 12.1 X10*3/UL (ref 4.4–11.3)
WBC # BLD AUTO: 16.8 X10*3/UL (ref 4.4–11.3)

## 2023-11-27 PROCEDURE — 85027 COMPLETE CBC AUTOMATED: CPT

## 2023-11-27 PROCEDURE — 2500000001 HC RX 250 WO HCPCS SELF ADMINISTERED DRUGS (ALT 637 FOR MEDICARE OP): Performed by: STUDENT IN AN ORGANIZED HEALTH CARE EDUCATION/TRAINING PROGRAM

## 2023-11-27 PROCEDURE — P9016 RBC LEUKOCYTES REDUCED: HCPCS

## 2023-11-27 PROCEDURE — 36415 COLL VENOUS BLD VENIPUNCTURE: CPT | Performed by: STUDENT IN AN ORGANIZED HEALTH CARE EDUCATION/TRAINING PROGRAM

## 2023-11-27 PROCEDURE — 36415 COLL VENOUS BLD VENIPUNCTURE: CPT

## 2023-11-27 PROCEDURE — 85027 COMPLETE CBC AUTOMATED: CPT | Performed by: STUDENT IN AN ORGANIZED HEALTH CARE EDUCATION/TRAINING PROGRAM

## 2023-11-27 PROCEDURE — 1100000001 HC PRIVATE ROOM DAILY

## 2023-11-27 PROCEDURE — 36430 TRANSFUSION BLD/BLD COMPNT: CPT

## 2023-11-27 PROCEDURE — 99231 SBSQ HOSP IP/OBS SF/LOW 25: CPT | Performed by: NURSE PRACTITIONER

## 2023-11-27 RX ADMIN — ACETAMINOPHEN 975 MG: 325 TABLET ORAL at 19:08

## 2023-11-27 RX ADMIN — IBUPROFEN 600 MG: 600 TABLET, FILM COATED ORAL at 06:49

## 2023-11-27 RX ADMIN — IBUPROFEN 600 MG: 600 TABLET, FILM COATED ORAL at 00:31

## 2023-11-27 RX ADMIN — IBUPROFEN 600 MG: 600 TABLET, FILM COATED ORAL at 13:04

## 2023-11-27 RX ADMIN — ACETAMINOPHEN 975 MG: 325 TABLET ORAL at 06:49

## 2023-11-27 RX ADMIN — ACETAMINOPHEN 975 MG: 325 TABLET ORAL at 13:04

## 2023-11-27 RX ADMIN — ACETAMINOPHEN 975 MG: 325 TABLET ORAL at 00:31

## 2023-11-27 RX ADMIN — IBUPROFEN 600 MG: 600 TABLET, FILM COATED ORAL at 19:08

## 2023-11-27 ASSESSMENT — PAIN SCALES - GENERAL
PAINLEVEL_OUTOF10: 5 - MODERATE PAIN
PAINLEVEL_OUTOF10: 5 - MODERATE PAIN
PAINLEVEL_OUTOF10: 0 - NO PAIN

## 2023-11-27 NOTE — DISCHARGE INSTRUCTIONS

## 2023-11-27 NOTE — CARE PLAN
The patient's goals for the shift include  (healthy mom and baby)    The clinical goals for the shift include Light to scant lochia      Problem: Pain - Adult  Goal: Verbalizes/displays adequate comfort level or baseline comfort level  Outcome: Progressing     Problem: Hypertensive Disorder of Pregnancy (HDP)  Goal: Minimal s/sx of HDP and BP<160/110  Outcome: Progressing  Goal: Adequate urine output (0.5 ml/kg/hr)  Outcome: Progressing     Problem: Safety - Adult  Goal: Free from fall injury  Outcome: Progressing     Problem: Postpartum  Goal: Experiences normal postpartum course  Outcome: Progressing  Goal: Appropriate maternal -  bonding  Outcome: Progressing  Goal: Establish and maintain infant feeding pattern for adequate nutrition  Outcome: Progressing  Goal: Incisions, wounds, or drain sites healing without S/S of infection  Outcome: Progressing  Goal: No s/sx infection  Outcome: Progressing  Goal: No s/sx of hemorrhage  Outcome: Progressing  Goal: Minimal s/sx of HDP and BP<160/110  Outcome: Progressing

## 2023-11-27 NOTE — SIGNIFICANT EVENT
BP Cuff and Home Monitoring  Patient meets criteria for home monitoring of blood pressure post discharge.  Met with patient to assess for availability of home BP monitor.  Patient stated she owns home BP monitor. Patient educated on importance of continuing to monitor BP at home, recording BP on home monitoring log and s/sx of when to call her provider.  Pt verbalized understanding the above information.

## 2023-11-27 NOTE — PROGRESS NOTES
Postpartum Progress Note      Assessment/Plan     Imelda Mendoza is a 28 y.o., , who had a Vaginal, Spontaneous   delivery on 2023  at 38w5d and is now postpartum day 1.    #PPH  - s/p EUA/D&C  - s/p TXA, hemabate, cytotec, Pit bolus, tEBL: 1500 ml   - Hgb 9.8 -> 1U pRBC -> 9.4 immediate postop -> 8.7 -> PPD1 7.1   - transfuse additional 1u PRBC now and repeat CB in 4hrs  - S/p Ancef x2 + Flagyl     #Postpartum  - continue routine postpartum care  - pain well controlled on po medications  - DVT Score: 3 ; SCDs  - The patient's blood type is A POS. Rhogam is not indicated.    #Maternal Well-Being  - emotional support provided  - bonding with infant  - Contraception: POPs We discussed the need to take the pill at exactly the same time everyday and if >2hrs late a backup method must be used for 1 week. Pt verbalized understanding.     #[place] Feeding  - breastfeeding/pumping encouraged; lactation consult prn    #Dispo  - anticipate d/c on PPD #2 if continuing to meet all postpartum milestones  - for f/u 4-6 weeks with Primary OB provider      Principal Problem:    Normal labor  Active Problems:    HTN (hypertension)    Retained complete placenta         Subjective   denies dizziness/lightheadedness/LOC/uncontrolled bleeding.    Meeting all postpartum milestones- ambulating independently, passing flatus, tolerating PO intake, lochia light, voiding spontaneously, and pain well controlled with PO meds.    Objective   Physical Exam:  General: well appearing, well nourished, postpartum  Obstetric: fundus firm below umbilicus, lochia light  Skin: Warm, dry; no rashes/lesions/erythema  Breast: No masses, nipple discharge  Neuro: A/Ox3, conversational, no gross motor deficit   GI: no distension, appropriately tender, soft, +BS  Respiratory: Even and unlabored on RA, LSCTA BL  Cardiovascular: Trace BLE edema; No erythema, warmth  Psych: appropriate mood and affect    Last Vitals:  Temp Pulse Resp BP MAP Pulse Ox    36.3 °C (97.3 °F) 78 16 111/70   96 %       Vitals Min/Max Last 24 Hours:  Temp  Min: 36.3 °C (97.3 °F)  Max: 37.4 °C (99.3 °F)  Pulse  Min: 71  Max: 113  Resp  Min: 16  Max: 18  BP  Min: 105/66  Max: 128/79    Lab Data:  Lab Results   Component Value Date    WBC 16.8 (H) 11/27/2023    HGB 7.1 (L) 11/27/2023    HCT 22.1 (L) 11/27/2023     (L) 11/27/2023

## 2023-11-28 ENCOUNTER — PHARMACY VISIT (OUTPATIENT)
Dept: PHARMACY | Facility: CLINIC | Age: 28
End: 2023-11-28
Payer: MEDICARE

## 2023-11-28 VITALS
SYSTOLIC BLOOD PRESSURE: 128 MMHG | RESPIRATION RATE: 16 BRPM | DIASTOLIC BLOOD PRESSURE: 74 MMHG | HEART RATE: 79 BPM | HEIGHT: 64 IN | BODY MASS INDEX: 26.35 KG/M2 | OXYGEN SATURATION: 97 % | WEIGHT: 154.32 LBS | TEMPERATURE: 97.7 F

## 2023-11-28 PROBLEM — D62 ACUTE BLOOD LOSS ANEMIA: Status: ACTIVE | Noted: 2023-11-28

## 2023-11-28 LAB
BLOOD EXPIRATION DATE: NORMAL
DISPENSE STATUS: NORMAL
PRODUCT BLOOD TYPE: 6200
PRODUCT CODE: NORMAL
UNIT ABO: NORMAL
UNIT NUMBER: NORMAL
UNIT RH: NORMAL
UNIT VOLUME: 350
XM INTEP: NORMAL

## 2023-11-28 PROCEDURE — RXMED WILLOW AMBULATORY MEDICATION CHARGE

## 2023-11-28 PROCEDURE — 2500000001 HC RX 250 WO HCPCS SELF ADMINISTERED DRUGS (ALT 637 FOR MEDICARE OP): Performed by: STUDENT IN AN ORGANIZED HEALTH CARE EDUCATION/TRAINING PROGRAM

## 2023-11-28 RX ORDER — NORETHINDRONE 0.35 MG/1
1 TABLET ORAL DAILY
Qty: 28 TABLET | Refills: 1 | Status: SHIPPED | OUTPATIENT
Start: 2023-11-28 | End: 2023-12-22 | Stop reason: ALTCHOICE

## 2023-11-28 RX ORDER — ACETAMINOPHEN 500 MG
1000 TABLET ORAL EVERY 6 HOURS PRN
Qty: 120 TABLET | Refills: 0 | OUTPATIENT
Start: 2023-11-28 | End: 2024-03-02

## 2023-11-28 RX ORDER — IBUPROFEN 600 MG/1
600 TABLET ORAL EVERY 6 HOURS PRN
Qty: 120 TABLET | Refills: 0 | Status: SHIPPED | OUTPATIENT
Start: 2023-11-28 | End: 2023-12-28

## 2023-11-28 RX ADMIN — ACETAMINOPHEN 975 MG: 325 TABLET ORAL at 06:35

## 2023-11-28 RX ADMIN — IBUPROFEN 600 MG: 600 TABLET, FILM COATED ORAL at 06:35

## 2023-11-28 RX ADMIN — ACETAMINOPHEN 975 MG: 325 TABLET ORAL at 00:54

## 2023-11-28 RX ADMIN — IBUPROFEN 600 MG: 600 TABLET, FILM COATED ORAL at 00:54

## 2023-11-28 ASSESSMENT — PAIN SCALES - GENERAL: PAINLEVEL_OUTOF10: 5 - MODERATE PAIN

## 2023-11-28 NOTE — PROGRESS NOTES
Social Work Assessment     Patient: Imelda Mendoza, 29yo,   Address: [address] [place] [address]  Phone: [phone]    Referral Reason: anxiety    Prenatal Care: [facility] x 5  Barriers: denies    [place] Name: Kelly Kay   : 23    Other Children: 3    FOB: Eugene Kay    Household Composition: Ms Mendoza initially reporting that she lived alone with her children, states FOB involved and supportive but does not live with her. Upon additional chart review after Ms Mendoza and  discharged, it was noted that DCFS took EC of last child and placed that child with FOB Eugene Kay. SW spoke to Ms Mendoza, who acknowledged this but stated case was closed. She states that child lives with FOB and she lives with the older 2 children.     Supports: Ms Mendoza identifies FOB as her primary support.     IPV/DV or Safety Concerns: Ms Mendoza denies    Car-Seat: yes  Safe Sleep Space: yes  Safe Sleep Education: reviewed    Transportation Concerns: denies, reports FOB provides transportation    Substance Use History: Ms Mendoza denies    Mental Health Diagnoses/Concerns: Per chart, Ms Mendoza with a hx of anxiety. She denies concerns this pregnancy; mood appears stable and she states she is coping well. SW reviewed postpartum depression and anxiety symptoms and resources and Ms Mendoza indicated understanding.     Bonding: Ms Mendoza appears to be participating in care and banding with .     Substance Use History: denies    Toxicology Screens: none    Department of Children and Family Services (DCFS): Per additional chart review after discharge, Ms Mendoza with a history of involvement last pregnancy with loss of custody. Based on this, SW made FYI of birth to DCFS. DCFS aware  has discharged home with Ms Mendoza and Ms Mendoza aware of report. Call ID# [id number]. SW confirmed no DCFS alert letter received, no other indications for DCFS referral present.     Assessment: SAPPHIRE met with Ms  Reji for assessment. She reports she has support and needed items for . She denies IPV, substance use, and depression. Ms Reji and  cleared by SW based on this but additional information about DCFS involvement noted after discharge so DCFS FYI made.     Plan: Ms Mendoza and  clear by SW.    Signature: TOÑITO Coats

## 2023-11-28 NOTE — DISCHARGE SUMMARY
Discharge Summary    Admission Date: 2023  Discharge Date: 23  Discharge Diagnosis: Normal labor     Patient Active Problem List   Diagnosis    Marginal insertion of umbilical cord affecting management of mother    Limited prenatal care in third trimester    Iron deficiency anemia    GBS bacteriuria    Supervision of high risk pregnancy in third trimester    Normal labor    HTN (hypertension)    Acute blood loss anemia       Hospital Course  Imelda Mendoza is a 28 y.o.,     Initially presented for: contractions and back pain; admitted for IOL 2/2 persistent fetal tachycardia.      Admission Date: 2023    Delivery Date: 2023  10:09 AM     Delivery type: Vaginal, Spontaneous      GA at delivery: 38w5d    Outcome: Living     Anesthesia during delivery: None     Intrapartum complications: complete retained placenta    Feeding method: Breastfeeding Status: No    Contraception: POPs. We discussed the need to take the pill at exactly the same time everyday and if >2hrs late a backup method must be used for 1 week. Pt verbalized understanding.     Rhogam: The patient's blood type is A POS. Rhogam is not indicated.     Now postpartum day: 2.    Hospital course n/f:  Underwent urgent EUA and placenta removal under GA given high volume, rapid hemorrhage. tEBL 1500 Hg, s/p 2u PRBC, 1x dose IV Fe; Hg now 8.9; asymptomatic, for PO Fe on DC    PP course otherwise uneventful.  Meeting all postpartum milestones- ambulating independently, passing flatus, tolerating PO intake, lochia light, voiding spontaneously, and pain well controlled with PO meds.     Dispo  OK for DC today and 6 week postpartum follow-up with prenatal provider.     Pertinent Physical Exam At Time of Discharge  General: well appearing, well nourished, postpartum  Obstetric: fundus firm below umbilicus, lochia light  Skin: Warm, dry; no rashes/lesions/erythema  Breast: No masses, nipple discharge  Neuro: A/Ox3, conversational, no  gross motor deficit   GI: no distension, appropriately tender, soft, +BS  Respiratory: Even and unlabored on RA, LSCTA BL  Cardiovascular: Trace BLE edema; No erythema, warmth  Psych: appropriate mood and affect       Your medication list        START taking these medications        Instructions Last Dose Given Next Dose Due   acetaminophen 500 mg tablet  Commonly known as: Tylenol      Take 2 tablets (1,000 mg) by mouth every 6 hours if needed for moderate pain (4 - 6).       ibuprofen 600 mg tablet      Take 1 tablet (600 mg) by mouth every 6 hours if needed for moderate pain (4 - 6) (pain).       norethindrone 0.35 mg tablet  Commonly known as: Micronor      Take 1 tablet (0.35 mg) over 28 days by mouth once daily.              CONTINUE taking these medications        Instructions Last Dose Given Next Dose Due   FeroSuL tablet  Generic drug: ferrous sulfate (325 mg ferrous sulfate)      Take 1 tablet (325 mg) by mouth 2 times a day. Every other day       Prenatabs Rx 29 mg iron- 1 mg tablet  Generic drug: prenatal vit,calc76-iron-folic      TAKE 1 TABLET BY MOUTH ONCE DAILY              STOP taking these medications      aspirin 81 mg chewable tablet        docusate sodium 100 mg capsule  Commonly known as: Colace                  Where to Get Your Medications        These medications were sent to [facility]  [address] [place] [address]      Hours: 8:30 AM to 5 PM Mon-Fri Phone: [phone]   acetaminophen 500 mg tablet  ibuprofen 600 mg tablet  norethindrone 0.35 mg tablet           Outpatient Follow-Up  No future appointments.    Nicole Weiss, APRN-CNP

## 2023-11-29 NOTE — SIGNIFICANT EVENT
Follow-up Phone Call Note:   Interview:  Care Type: Women's Health   Phone Number Call  [phone]   Call Outcome: Phone Disconected      Date/Time Of Call: 11/29/23 at 1326   Call Back Done By: care coordinator   Callback Complete:  Yes

## 2023-11-30 LAB
BLOOD EXPIRATION DATE: NORMAL
DISPENSE STATUS: NORMAL
LABORATORY COMMENT REPORT: NORMAL
PATH REPORT.FINAL DX SPEC: NORMAL
PATH REPORT.GROSS SPEC: NORMAL
PATH REPORT.RELEVANT HX SPEC: NORMAL
PATH REPORT.TOTAL CANCER: NORMAL
PRODUCT BLOOD TYPE: 6200
PRODUCT CODE: NORMAL
UNIT ABO: NORMAL
UNIT NUMBER: NORMAL
UNIT RH: NORMAL
UNIT VOLUME: 350
XM INTEP: NORMAL

## 2023-12-01 ENCOUNTER — APPOINTMENT (OUTPATIENT)
Dept: RADIOLOGY | Facility: CLINIC | Age: 28
End: 2023-12-01
Payer: COMMERCIAL

## 2023-12-08 ENCOUNTER — APPOINTMENT (OUTPATIENT)
Dept: OBSTETRICS AND GYNECOLOGY | Facility: CLINIC | Age: 28
End: 2023-12-08
Payer: COMMERCIAL

## 2023-12-22 ENCOUNTER — OFFICE VISIT (OUTPATIENT)
Dept: OBSTETRICS AND GYNECOLOGY | Facility: CLINIC | Age: 28
End: 2023-12-22
Payer: COMMERCIAL

## 2023-12-22 VITALS
DIASTOLIC BLOOD PRESSURE: 73 MMHG | HEART RATE: 80 BPM | WEIGHT: 134.9 LBS | HEIGHT: 64 IN | SYSTOLIC BLOOD PRESSURE: 138 MMHG | BODY MASS INDEX: 23.03 KG/M2

## 2023-12-22 DIAGNOSIS — Z30.013 ENCOUNTER FOR INITIAL PRESCRIPTION OF INJECTABLE CONTRACEPTIVE: ICD-10-CM

## 2023-12-22 DIAGNOSIS — Z32.02 PREGNANCY TEST NEGATIVE: ICD-10-CM

## 2023-12-22 LAB — PREGNANCY TEST URINE, POC: NEGATIVE

## 2023-12-22 PROCEDURE — 0503F POSTPARTUM CARE VISIT: CPT | Performed by: NURSE PRACTITIONER

## 2023-12-22 PROCEDURE — 2500000004 HC RX 250 GENERAL PHARMACY W/ HCPCS (ALT 636 FOR OP/ED): Mod: SE | Performed by: NURSE PRACTITIONER

## 2023-12-22 RX ORDER — MEDROXYPROGESTERONE ACETATE 150 MG/ML
150 INJECTION, SUSPENSION INTRAMUSCULAR
Status: SHIPPED | OUTPATIENT
Start: 2023-12-22 | End: 2025-03-16

## 2023-12-22 RX ADMIN — MEDROXYPROGESTERONE ACETATE 150 MG: 150 INJECTION, SUSPENSION INTRAMUSCULAR at 15:32

## 2023-12-22 ASSESSMENT — PAIN SCALES - GENERAL: PAINLEVEL: 1

## 2023-12-22 NOTE — PROGRESS NOTES
"Plan    Advised to call office for breast complaints, abnormal bleeding, mood changes, or other concerning symptoms.   Cleared to resume normal activity as desired    Diagnoses and all orders for this visit:  Postpartum normal course  Encounter for initial prescription of injectable contraceptive  -     medroxyPROGESTERone (Depo-Provera) injection 150 mg  Pregnancy test negative  -     POC pregnancy, urine    Recommended ibuprofen for pain relief, discussed likely due to poor posture with holding baby.    Follow up in about 3 months (around 3/22/2024) for RN Visit for Depo.    Phoebe Almeida, APRN-CNM, APRN-CNP    Subjective   28 y.o.  presenting for postpartum follow-up     Delivery Date: 23  Gestational Age: 38.5wga   Type of Delivery: Vaginal, Spontaneous      Concerns: neck and back pain    Pain: controlled  Lacerations: none  Lochia: resolved 1 week  Sexual Intimacy: Yes  Contraceptive Method:  desires Depo  Feeding Method: She is bottle feeding. no breast or nursing problems  Lactation Consult Needed?: No    Birth Trauma: No  Bonding with Baby: well with baby girl   Mood: denies concerns    Last pap: 10/2021 NILM, HPV (-)  Objective   /73   Pulse 80   Ht 1.626 m (5' 4\")   Wt 61.2 kg (134 lb 14.4 oz)   LMP 2023 (Approximate)   Breastfeeding No   BMI 23.16 kg/m²     Physical Exam  Constitutional:       Appearance: Normal appearance.   Pulmonary:      Effort: Pulmonary effort is normal.   Abdominal:      Palpations: Abdomen is soft.      Tenderness: There is no abdominal tenderness.   Neurological:      General: No focal deficit present.      Mental Status: She is alert and oriented to person, place, and time. Mental status is at baseline.   Skin:     General: Skin is warm and dry.   Psychiatric:         Mood and Affect: Mood normal.         Behavior: Behavior normal.         Thought Content: Thought content normal.         Judgment: Judgment normal.        "

## 2024-01-03 ENCOUNTER — HOSPITAL ENCOUNTER (EMERGENCY)
Facility: HOSPITAL | Age: 29
Discharge: HOME | End: 2024-01-03
Payer: COMMERCIAL

## 2024-01-03 VITALS
DIASTOLIC BLOOD PRESSURE: 92 MMHG | OXYGEN SATURATION: 99 % | HEART RATE: 85 BPM | HEIGHT: 64 IN | RESPIRATION RATE: 16 BRPM | BODY MASS INDEX: 20.49 KG/M2 | SYSTOLIC BLOOD PRESSURE: 138 MMHG | WEIGHT: 120 LBS | TEMPERATURE: 98.4 F

## 2024-01-03 DIAGNOSIS — R51.9 NONINTRACTABLE HEADACHE, UNSPECIFIED CHRONICITY PATTERN, UNSPECIFIED HEADACHE TYPE: Primary | ICD-10-CM

## 2024-01-03 PROCEDURE — 99282 EMERGENCY DEPT VISIT SF MDM: CPT

## 2024-01-03 PROCEDURE — 99283 EMERGENCY DEPT VISIT LOW MDM: CPT

## 2024-01-03 PROCEDURE — 2500000001 HC RX 250 WO HCPCS SELF ADMINISTERED DRUGS (ALT 637 FOR MEDICARE OP): Mod: SE | Performed by: PHYSICIAN ASSISTANT

## 2024-01-03 PROCEDURE — 99283 EMERGENCY DEPT VISIT LOW MDM: CPT | Performed by: PHYSICIAN ASSISTANT

## 2024-01-03 RX ORDER — IBUPROFEN 600 MG/1
600 TABLET ORAL EVERY 6 HOURS PRN
Qty: 30 TABLET | Refills: 0 | OUTPATIENT
Start: 2024-01-03 | End: 2024-03-02

## 2024-01-03 RX ORDER — ACETAMINOPHEN 325 MG/1
975 TABLET ORAL ONCE
Status: COMPLETED | OUTPATIENT
Start: 2024-01-03 | End: 2024-01-03

## 2024-01-03 RX ORDER — ACETAMINOPHEN 325 MG/1
650 TABLET ORAL EVERY 6 HOURS PRN
Qty: 30 TABLET | Refills: 0 | OUTPATIENT
Start: 2024-01-03 | End: 2024-03-02

## 2024-01-03 RX ORDER — IBUPROFEN 600 MG/1
600 TABLET ORAL ONCE
Status: COMPLETED | OUTPATIENT
Start: 2024-01-03 | End: 2024-01-03

## 2024-01-03 RX ADMIN — IBUPROFEN 600 MG: 600 TABLET, FILM COATED ORAL at 08:08

## 2024-01-03 RX ADMIN — ACETAMINOPHEN 975 MG: 325 TABLET ORAL at 08:09

## 2024-01-03 ASSESSMENT — COLUMBIA-SUICIDE SEVERITY RATING SCALE - C-SSRS
1. IN THE PAST MONTH, HAVE YOU WISHED YOU WERE DEAD OR WISHED YOU COULD GO TO SLEEP AND NOT WAKE UP?: NO
6. HAVE YOU EVER DONE ANYTHING, STARTED TO DO ANYTHING, OR PREPARED TO DO ANYTHING TO END YOUR LIFE?: NO
2. HAVE YOU ACTUALLY HAD ANY THOUGHTS OF KILLING YOURSELF?: NO

## 2024-01-03 NOTE — ED TRIAGE NOTES
Pt has a headache and spotting and recently got put back on depo and thinks that it is the cause of her symptoms

## 2024-01-03 NOTE — DISCHARGE INSTRUCTIONS
Take the Tylenol/ibuprofen for headaches.  Get plenty rest and stay well-hydrated.  If your headache persists follow-up with your OB/GYN or PCP, if you need a new 1 call the number listed below.

## 2024-01-03 NOTE — ED PROVIDER NOTES
HPI:  28-year-old female presenting for headache.  States she thinks it is related to her Depo-Provera shot.  Does not frequently get headaches.  Denies this is not the worst of her life.  States it was slow in onset and not acute sudden in nature.  Denies any other symptoms including photophobia, blurry vision, weakness, paresthesia, nausea, vomiting, trouble walking.  Has not tried anything whatsoever for headache including any over-the-counter analgesics.  Denies any fevers chills night sweats or rigors.  States she also is spotting now which she associates with her Depo-Provera which she states is normal for her and is not concerned about this.    Physical Exam:   GEN: Vitals noted. NAD  EYES:  EOMs grossly intact, anicteric sclera  BRENDA: Mucosa moist.  NECK: Supple.  CARD: RRR  PULMONARY: Moving air well. Clear all lung fields.  ABDOMEN: Soft, no guarding, no rigidity. Nontender. NABS  EXTREMITIES: Full ROM, no pitting edema,   SKIN: Intact, warm and dry  NEURO: Alert and oriented x 3, speech is clear, no obvious deficits noted.  Cranial nerves intact, intact sensation strength upper extremities, no limb ataxia, nontoxic gait    ----------------------------------------------------------------------------------------------------------------------------    MDM:  28-year-old female presenting for headache.  On exam she is well-appearing in bed comfortably.  Vital signs stable with some hypertension here.  Neuro intact.  With a benign neuroexam and being very well-appearing, given lack of red flag signs symptoms I do not suspect any acute intracranial process therefore no indication for laboratory work or advanced imaging at this time.  Will treat her with Tylenol and ibuprofen, first dose given in the ED.  She is told to rest and get hydration.  To follow-up with her PCP for her hypertension here.  Follow-up with her OB/GYN about her Depo needs.  Return precautions reviewed.       No orders to display     Labs  Reviewed - No data to display  Diagnoses as of 01/03/24 0806   Nonintractable headache, unspecified chronicity pattern, unspecified headache type     ----------------------------------------------------------------------------------------------------------------------------    This note was dictated using a speech recognition program.  While an attempt was made at proof reading to minimize errors, minor errors in transcription may be present call for questions.     Jose F Carballo PA-C  01/03/24 0810

## 2024-03-02 ENCOUNTER — HOSPITAL ENCOUNTER (EMERGENCY)
Facility: HOSPITAL | Age: 29
Discharge: HOME | End: 2024-03-02
Attending: EMERGENCY MEDICINE
Payer: COMMERCIAL

## 2024-03-02 VITALS
OXYGEN SATURATION: 99 % | HEART RATE: 109 BPM | BODY MASS INDEX: 20.49 KG/M2 | DIASTOLIC BLOOD PRESSURE: 77 MMHG | HEIGHT: 64 IN | WEIGHT: 120 LBS | RESPIRATION RATE: 16 BRPM | TEMPERATURE: 98.4 F | SYSTOLIC BLOOD PRESSURE: 128 MMHG

## 2024-03-02 DIAGNOSIS — M54.6 ACUTE MIDLINE THORACIC BACK PAIN: Primary | ICD-10-CM

## 2024-03-02 PROCEDURE — 99284 EMERGENCY DEPT VISIT MOD MDM: CPT | Performed by: EMERGENCY MEDICINE

## 2024-03-02 PROCEDURE — 2500000005 HC RX 250 GENERAL PHARMACY W/O HCPCS: Mod: SE | Performed by: EMERGENCY MEDICINE

## 2024-03-02 PROCEDURE — 99283 EMERGENCY DEPT VISIT LOW MDM: CPT

## 2024-03-02 RX ORDER — LIDOCAINE 560 MG/1
1 PATCH PERCUTANEOUS; TOPICAL; TRANSDERMAL DAILY
Status: DISCONTINUED | OUTPATIENT
Start: 2024-03-02 | End: 2024-03-02 | Stop reason: HOSPADM

## 2024-03-02 RX ORDER — ACETAMINOPHEN 325 MG/1
650 TABLET ORAL EVERY 6 HOURS PRN
Qty: 56 TABLET | Refills: 0 | Status: SHIPPED | OUTPATIENT
Start: 2024-03-02 | End: 2024-03-09

## 2024-03-02 RX ORDER — CYCLOBENZAPRINE HCL 10 MG
10 TABLET ORAL 3 TIMES DAILY PRN
Qty: 15 TABLET | Refills: 0 | Status: SHIPPED | OUTPATIENT
Start: 2024-03-02 | End: 2024-05-07 | Stop reason: WASHOUT

## 2024-03-02 RX ORDER — LIDOCAINE AND MENTHOL 40; 40 MG/1; MG/1
1 PATCH TOPICAL EVERY 12 HOURS PRN
Qty: 10 PATCH | Refills: 0 | Status: SHIPPED | OUTPATIENT
Start: 2024-03-02 | End: 2024-05-07 | Stop reason: WASHOUT

## 2024-03-02 RX ORDER — ACETAMINOPHEN 325 MG/1
650 TABLET ORAL ONCE
Status: COMPLETED | OUTPATIENT
Start: 2024-03-02 | End: 2024-03-02

## 2024-03-02 RX ORDER — IBUPROFEN 600 MG/1
600 TABLET ORAL EVERY 6 HOURS PRN
Qty: 28 TABLET | Refills: 0 | Status: SHIPPED | OUTPATIENT
Start: 2024-03-02 | End: 2024-03-09

## 2024-03-02 RX ADMIN — LIDOCAINE 1 PATCH: 4 PATCH TOPICAL at 12:15

## 2024-03-02 RX ADMIN — ACETAMINOPHEN 650 MG: 325 TABLET ORAL at 12:15

## 2024-03-02 ASSESSMENT — PAIN - FUNCTIONAL ASSESSMENT: PAIN_FUNCTIONAL_ASSESSMENT: 0-10

## 2024-03-02 ASSESSMENT — PAIN SCALES - GENERAL: PAINLEVEL_OUTOF10: 8

## 2024-03-02 ASSESSMENT — COLUMBIA-SUICIDE SEVERITY RATING SCALE - C-SSRS
1. IN THE PAST MONTH, HAVE YOU WISHED YOU WERE DEAD OR WISHED YOU COULD GO TO SLEEP AND NOT WAKE UP?: NO
2. HAVE YOU ACTUALLY HAD ANY THOUGHTS OF KILLING YOURSELF?: NO
6. HAVE YOU EVER DONE ANYTHING, STARTED TO DO ANYTHING, OR PREPARED TO DO ANYTHING TO END YOUR LIFE?: NO

## 2024-03-02 NOTE — ED PROVIDER NOTES
"Limitations to History: None     HPI:      Imelda Mendoza is a 28 y.o. with back pain x 3 months.  Patient has been using months ago and has had neck and back pain since.  It is around both sides of her neck a little worse on the left, and the midline of her back mid thoracic.  She does not have any fevers, she did not have an epidural.  Or .  Denies weakness bowel or bladder problems or any other issues with her arms or legs.  She denies trauma.  She is been sleeping in the bed not a chair.    ------------------------------------------------------------------------------------------------------------------------------------------    VS: /77 (BP Location: Right arm, Patient Position: Sitting)   Pulse (!) 109   Temp 36.9 °C (98.4 °F) (Temporal)   Resp 16   Ht 1.626 m (5' 4\")   Wt 54.4 kg (120 lb)   SpO2 99%   BMI 20.60 kg/m²     Physical Exam:  Gen: Alert, NAD  Head/Neck: NCAT, neck w/ FROM  Eyes: EOMI, PERRL, anicteric sclerae, noninjected conjunctivae  Mouth:  MMM, no OP lesions noted  Neck: palpable muscle spasm in the L neck and L paraspinal T8-T12, nothing in the midline   Heart: RRR no MRG  Lungs: CTA b/l no RRW, no increased work of breathing  Abdomen: soft, NT, ND, no HSM, no palpable masses  Musculoskeletal: no joint swelling noted  Extremities: WWP, no c/c/e, cap refill <2sec  Neurologic: Alert, symmetrical facies, phonates clearly, moves all extremities equally, responsive to touch, ambulates normally       ------------------------------------------------------------------------------------------------------------------------------------------    Medical Decision Making: No new numbness/weakness/tingling in her legs, bowel/bladder incontinence, new trauma, fever, unexpected weight loss, h/o cancer, h/o IVDU, or indwelling lines. No midline C T L spine tenderness palpation or step-offs.  5 out of 5 strength in hip and knee flexion extension as well as ankle and great toe plantar " dorsiflexion bilaterally.  Normal sensation to light touch in L1-S4 nerve roots bilaterally.  2+ DP pulses bilaterally.  No concern for osteomyelitis, discitis, epidural abscess, cord compression, fracture, or cauda equina.  I do not believe they require MRI or CT imaging at this time.      We discussed the need for likely some sort of muscle manipulation like massage or physical therapy.  She will talk to her primary care physician.  She was given Tylenol ibuprofen for muscle relaxers and some lidocaine patches.      Diagnoses as of 03/02/24 1216   Acute midline thoracic back pain       Medications   lidocaine 4 % patch 1 patch (has no administration in time range)   acetaminophen (Tylenol) tablet 650 mg (650 mg oral Given 3/2/24 1215)          Jhony Abreu MD  03/02/24 1216

## 2024-03-02 NOTE — DISCHARGE INSTRUCTIONS
Return to ER immediately for worsening of symptoms, numbness or weakness to legs, numbness to groin, loss of control of bowel or bladder (including incontinence), inability to urinate, blood in your urine, fever, or abdominal pain.    You are being discharged home today with muscle relaxors Please take this only when the tightness is severe and only as instructed.  You should not drive, operate machinery or be responsible for small children while on these meds.  Please do not mix them with other sedating medications including benzodiazepines, alcohol, or narcotics.

## 2024-04-26 ENCOUNTER — HOSPITAL ENCOUNTER (EMERGENCY)
Facility: HOSPITAL | Age: 29
Discharge: HOME | End: 2024-04-26
Payer: COMMERCIAL

## 2024-04-26 VITALS
WEIGHT: 120 LBS | HEIGHT: 64 IN | SYSTOLIC BLOOD PRESSURE: 130 MMHG | RESPIRATION RATE: 19 BRPM | OXYGEN SATURATION: 98 % | BODY MASS INDEX: 20.49 KG/M2 | HEART RATE: 91 BPM | TEMPERATURE: 98.1 F | DIASTOLIC BLOOD PRESSURE: 77 MMHG

## 2024-04-26 DIAGNOSIS — M54.9 CHRONIC NECK AND BACK PAIN: Primary | ICD-10-CM

## 2024-04-26 DIAGNOSIS — M54.2 CHRONIC NECK AND BACK PAIN: Primary | ICD-10-CM

## 2024-04-26 DIAGNOSIS — G89.29 CHRONIC NECK AND BACK PAIN: Primary | ICD-10-CM

## 2024-04-26 LAB — PREGNANCY TEST URINE, POC: NEGATIVE

## 2024-04-26 PROCEDURE — 99282 EMERGENCY DEPT VISIT SF MDM: CPT

## 2024-04-26 PROCEDURE — 81025 URINE PREGNANCY TEST: CPT | Performed by: PHYSICIAN ASSISTANT

## 2024-04-26 PROCEDURE — 2500000005 HC RX 250 GENERAL PHARMACY W/O HCPCS: Mod: SE | Performed by: PHYSICIAN ASSISTANT

## 2024-04-26 PROCEDURE — 99284 EMERGENCY DEPT VISIT MOD MDM: CPT | Performed by: PHYSICIAN ASSISTANT

## 2024-04-26 RX ORDER — LIDOCAINE 560 MG/1
1 PATCH PERCUTANEOUS; TOPICAL; TRANSDERMAL ONCE
Status: DISCONTINUED | OUTPATIENT
Start: 2024-04-26 | End: 2024-04-26 | Stop reason: HOSPADM

## 2024-04-26 RX ORDER — IBUPROFEN 600 MG/1
600 TABLET ORAL EVERY 6 HOURS PRN
Qty: 30 TABLET | Refills: 0 | Status: SHIPPED | OUTPATIENT
Start: 2024-04-26 | End: 2024-05-07 | Stop reason: WASHOUT

## 2024-04-26 RX ORDER — ACETAMINOPHEN 325 MG/1
975 TABLET ORAL ONCE
Status: COMPLETED | OUTPATIENT
Start: 2024-04-26 | End: 2024-04-26

## 2024-04-26 RX ORDER — ACETAMINOPHEN 500 MG
500 TABLET ORAL EVERY 6 HOURS PRN
Qty: 30 TABLET | Refills: 0 | Status: SHIPPED | OUTPATIENT
Start: 2024-04-26 | End: 2024-05-07 | Stop reason: WASHOUT

## 2024-04-26 RX ORDER — LIDOCAINE 50 MG/G
1 PATCH TOPICAL DAILY
Qty: 10 PATCH | Refills: 0 | Status: SHIPPED | OUTPATIENT
Start: 2024-04-26 | End: 2024-05-07 | Stop reason: WASHOUT

## 2024-04-26 RX ADMIN — LIDOCAINE 1 PATCH: 4 PATCH TOPICAL at 19:28

## 2024-04-26 RX ADMIN — ACETAMINOPHEN 975 MG: 325 TABLET ORAL at 19:28

## 2024-04-26 ASSESSMENT — PAIN - FUNCTIONAL ASSESSMENT: PAIN_FUNCTIONAL_ASSESSMENT: 0-10

## 2024-04-26 ASSESSMENT — COLUMBIA-SUICIDE SEVERITY RATING SCALE - C-SSRS
6. HAVE YOU EVER DONE ANYTHING, STARTED TO DO ANYTHING, OR PREPARED TO DO ANYTHING TO END YOUR LIFE?: NO
2. HAVE YOU ACTUALLY HAD ANY THOUGHTS OF KILLING YOURSELF?: NO
1. IN THE PAST MONTH, HAVE YOU WISHED YOU WERE DEAD OR WISHED YOU COULD GO TO SLEEP AND NOT WAKE UP?: NO

## 2024-04-26 ASSESSMENT — PAIN SCALES - GENERAL: PAINLEVEL_OUTOF10: 10 - WORST POSSIBLE PAIN

## 2024-04-26 NOTE — DISCHARGE INSTRUCTIONS
As we discussed in the emergency department today, unfortunately this has been going on for 6 years, this is not an easy fix or any solution that can be achieved in the emergency department.  Very important you follow-up with physical therapy to work on strengthening exercises.  You can take Tylenol and ibuprofen as needed for breakthrough pain.  Can use lidocaine patches as well.

## 2024-04-26 NOTE — ED TRIAGE NOTES
Pt c/o abd pain that started today. Denies NVD. No associated factors per pt that could have caused this.

## 2024-04-26 NOTE — ED PROVIDER NOTES
HPI   Chief Complaint   Patient presents with    Abdominal Pain       Patient is a 28-year-old female who presents today with chief complaint listed as abdominal pain however patient denies this being the reason she is here, she had a stomachache a few days ago that is completely resolved.  Reason she is here today is for back pain and neck pain.  Patient states been suffering from back pain and neck pain across her entire back ever since she had a baby 6 years ago.  Patient states it was present during pregnancy and that never went away.  Patient never had an epidural.  Patient states the pain has not intensified or worsen, is a same level of pain she is always had.  She has only ever been seen in the emergency room for it, she never followed up with her primary care doctor, seen a chiropractor or physical therapist.  Patient states this pain in the middle.  It is improved with Tylenol and ibuprofen as well as lidocaine patches, she states the medications work for about 6 hours and the pain comes back.  She denies any falls injuries or traumas.  Denies any saddle paresthesias, incontinence.  Denies any history of IVDU, unexplained fevers night sweats or weight loss.  Denies any numbness tingling weakness in the arms or legs.  Patient states the reason she has not seen her primary care doctor or physical therapist is because of her kids, it is difficult for her to keep appointments.  Patient states she last had Depo-Provera in January and states there is a chance of pregnancy.                          Leo Coma Scale Score: 15                     Patient History   Past Medical History:   Diagnosis Date    15 weeks gestation of pregnancy ([facility]) 05/01/2020    15 weeks gestation of pregnancy    Abnormal Pap smear of cervix     LSIL 2017, ASCUS-2018    Abnormal weight loss 10/13/2022    Unintentional weight loss    Acute blood loss anemia 11/28/2023    Anxiety disorder, unspecified 09/29/2022    Anxiety     Encounter for  screening for Streptococcus B ([facility]) 09/15/2020     screening for streptococcus B    Encounter for contraceptive management, unspecified 2019    Encounter for birth control    Encounter for gynecological examination (general) (routine) without abnormal findings 2022    Well woman exam    Encounter for pregnancy test, result positive ([facility]) 2020    Positive urine pregnancy test    Encounter for screening for diabetes mellitus 2020    Screening for diabetes mellitus (DM)    Encounter for screening for infections with a predominantly sexual mode of transmission 2020    Routine screening for STI (sexually transmitted infection)    Encounter for screening for infections with a predominantly sexual mode of transmission 09/15/2020    Routine screening for STI (sexually transmitted infection)    Encounter for screening for malignant neoplasm of cervix 2018    Encounter for Papanicolaou smear for cervical cancer screening    Encounter for supervision of normal pregnancy, unspecified, unspecified trimester ([facility]) 2020    Encounter for prenatal care    Gestational hypertension ([facility]) 2017    Other problems related to lifestyle     Other problems related to lifestyle    Personal history of other complications of pregnancy, childbirth and the puerperium 2020    History of gestational hypertension    Personal history of other specified conditions 2020    History of headache    Personal history of other specified conditions 2018    History of urinary frequency    Supervision of pregnancy with grand multiparity, third trimester ([facility]) 10/04/2020    High risk multigravida in third trimester     No past surgical history on file.  No family history on file.  Social History     Tobacco Use    Smoking status: Never     Passive exposure: Never    Smokeless tobacco: Never   Vaping Use    Vaping status: Never Used   Substance Use  Topics    Alcohol use: Never    Drug use: Never       Physical Exam   ED Triage Vitals [04/26/24 1826]   Temperature Heart Rate Respirations BP   36.7 °C (98.1 °F) 91 19 130/77      Pulse Ox Temp Source Heart Rate Source Patient Position   98 % Temporal Monitor Sitting      BP Location FiO2 (%)     Right arm --       Physical Exam  Vitals and nursing note reviewed.   Constitutional:       General: She is not in acute distress.     Appearance: Normal appearance. She is not toxic-appearing.   HENT:      Head: Normocephalic and atraumatic.      Nose: Nose normal.   Eyes:      Extraocular Movements: Extraocular movements intact.   Cardiovascular:      Rate and Rhythm: Normal rate and regular rhythm.   Pulmonary:      Effort: Pulmonary effort is normal.   Abdominal:      Palpations: Abdomen is soft.   Musculoskeletal:         General: Normal range of motion.      Cervical back: Normal range of motion and neck supple.        Back:       Comments: No bruising swelling step-offs or deformities, patient has diffuse midline tenderness to cervical thoracic and lumbar spine, no paraspinal musculature tenderness.  5 and 5 strength with hip flexion extension, knee flexion extension and dorsal plantarflexion.     Skin:     General: Skin is warm and dry.   Neurological:      General: No focal deficit present.      Mental Status: She is alert.   Psychiatric:         Mood and Affect: Mood normal.         Thought Content: Thought content normal.       No orders to display     Labs Reviewed   POCT PREGNANCY, URINE       Result Value    Preg Test, Ur Negative           ED Course & MDM   Diagnoses as of 04/26/24 1941   Chronic neck and back pain       Medical Decision Making    MDM: Patient is a 28-year-old female who presents today for evaluation of chronic neck and back pain, she has tenderness throughout the entire spine, states it is a same level of pain she is been having for 6 years.  I reviewed her prior records, she had imaging  of her cervical thoracic and lumbar spine January 9, 2022, there were no abnormalities at that time.  Patient does not have any red flag symptoms to warrant any imaging, she is neurovascularly intact with normal strength sensation to bilateral upper and lower extremities, she has no history of IVDU, she is not having any worsening symptoms.  She is very well-appearing on examination.  I discussed with patient with the duration of symptoms being 6 years, this is unlikely to be something that gets fixed in the emergency department today.  I discussed with her the importance of following up with physical therapy, I will place a referral for physical therapy here today however patient is aware that she likely will need to see a family doctor to get this accomplished, will also give her PCP follow-up.  Feel that she would benefit from strengthening exercises and stretches.  I do not feel that any further imaging is warranted especially since she had CT scans less than 2 years ago for a problem that is been present for 6 years.  Feel that risk of radiation outweigh any benefits, do not feel that there is any MRI imaging indicated and x-rays likely would be of low yield.  She does feel like Tylenol and ibuprofen really helped so I will refill this for her and also give her lidocaine patches which she endorses improvement with however as I discussed with the patient, this is not a permanent long-term solution, she is aware of the importance of following up with physical therapy or even a chiropractor if she feels like it however advised her against neck manipulation. Patient is not pregnant, was given Tylenol and lidocaine patches here and will be discharged with the same as well as ibuprofen.  She is stable for discharge at this time, return precautions to ED were discussed.  Again reiterated the importance of proper follow-up.          Procedure  Procedures     Kylie Webb PA-C  04/26/24 1941

## 2024-04-28 PROCEDURE — RXMED WILLOW AMBULATORY MEDICATION CHARGE

## 2024-04-30 ENCOUNTER — PHARMACY VISIT (OUTPATIENT)
Dept: PHARMACY | Facility: CLINIC | Age: 29
End: 2024-04-30
Payer: MEDICARE

## 2024-05-07 ENCOUNTER — PHARMACY VISIT (OUTPATIENT)
Dept: PHARMACY | Facility: CLINIC | Age: 29
End: 2024-05-07
Payer: MEDICARE

## 2024-05-07 ENCOUNTER — OFFICE VISIT (OUTPATIENT)
Dept: OBSTETRICS AND GYNECOLOGY | Facility: CLINIC | Age: 29
End: 2024-05-07
Payer: COMMERCIAL

## 2024-05-07 VITALS
DIASTOLIC BLOOD PRESSURE: 85 MMHG | HEIGHT: 64 IN | SYSTOLIC BLOOD PRESSURE: 122 MMHG | WEIGHT: 122.7 LBS | HEART RATE: 102 BPM | BODY MASS INDEX: 20.95 KG/M2

## 2024-05-07 DIAGNOSIS — M54.6 MIDLINE THORACIC BACK PAIN, UNSPECIFIED CHRONICITY: ICD-10-CM

## 2024-05-07 DIAGNOSIS — D50.8 OTHER IRON DEFICIENCY ANEMIA: ICD-10-CM

## 2024-05-07 DIAGNOSIS — Z01.419 WELL WOMAN EXAM WITH ROUTINE GYNECOLOGICAL EXAM: ICD-10-CM

## 2024-05-07 DIAGNOSIS — Z32.02 ENCOUNTER FOR PREGNANCY TEST, RESULT NEGATIVE: Primary | ICD-10-CM

## 2024-05-07 DIAGNOSIS — Z12.4 CERVICAL CANCER SCREENING: ICD-10-CM

## 2024-05-07 PROBLEM — F41.9 ANXIETY: Status: ACTIVE | Noted: 2024-05-07

## 2024-05-07 PROBLEM — R51.9 HEADACHE: Status: ACTIVE | Noted: 2024-05-07

## 2024-05-07 PROBLEM — K80.20 CHOLELITHIASIS: Status: ACTIVE | Noted: 2024-05-07

## 2024-05-07 LAB — PREGNANCY TEST URINE, POC: NEGATIVE

## 2024-05-07 PROCEDURE — 81025 URINE PREGNANCY TEST: CPT | Mod: GC | Performed by: STUDENT IN AN ORGANIZED HEALTH CARE EDUCATION/TRAINING PROGRAM

## 2024-05-07 PROCEDURE — 88175 CYTOPATH C/V AUTO FLUID REDO: CPT | Mod: TC,GCY | Performed by: STUDENT IN AN ORGANIZED HEALTH CARE EDUCATION/TRAINING PROGRAM

## 2024-05-07 PROCEDURE — 3079F DIAST BP 80-89 MM HG: CPT | Performed by: STUDENT IN AN ORGANIZED HEALTH CARE EDUCATION/TRAINING PROGRAM

## 2024-05-07 PROCEDURE — 81025 URINE PREGNANCY TEST: CPT | Performed by: STUDENT IN AN ORGANIZED HEALTH CARE EDUCATION/TRAINING PROGRAM

## 2024-05-07 PROCEDURE — 3074F SYST BP LT 130 MM HG: CPT | Performed by: STUDENT IN AN ORGANIZED HEALTH CARE EDUCATION/TRAINING PROGRAM

## 2024-05-07 PROCEDURE — RXMED WILLOW AMBULATORY MEDICATION CHARGE

## 2024-05-07 PROCEDURE — 99395 PREV VISIT EST AGE 18-39: CPT | Mod: GC | Performed by: STUDENT IN AN ORGANIZED HEALTH CARE EDUCATION/TRAINING PROGRAM

## 2024-05-07 PROCEDURE — 1036F TOBACCO NON-USER: CPT | Performed by: STUDENT IN AN ORGANIZED HEALTH CARE EDUCATION/TRAINING PROGRAM

## 2024-05-07 PROCEDURE — 99395 PREV VISIT EST AGE 18-39: CPT | Performed by: STUDENT IN AN ORGANIZED HEALTH CARE EDUCATION/TRAINING PROGRAM

## 2024-05-07 RX ORDER — CAPSAICIN 0.03 G/100G
CREAM TOPICAL 3 TIMES DAILY
Qty: 60 G | Refills: 1 | Status: SHIPPED | OUTPATIENT
Start: 2024-05-07 | End: 2025-05-07

## 2024-05-07 ASSESSMENT — PAIN SCALES - GENERAL: PAINLEVEL: 0-NO PAIN

## 2024-05-07 ASSESSMENT — ENCOUNTER SYMPTOMS: BACK PAIN: 1

## 2024-05-07 NOTE — PROGRESS NOTES
I saw and examined the patient.  I personally obtained the key and critical portions of the history and physical exam, and was physically present for the key and critical portions performed by the resident.  I reviewed the resident's documentation and discussed the patient with the resident.  I agreed with the resident's medical decision-making as documented in the resident's note.  Corinne Bazella MD

## 2024-05-07 NOTE — PROGRESS NOTES
Subjective   Patient ID: Imelda Mendoza is a 28 y.o. female who presents for Annual Exam (No reported pain or falls/LMP unknown/Last pap 10/14/2021. Pap was normal, negative for HPV/STI Screening declined/Chaperone declined).  29yo  presenting for annual/pap    Patient reports no GYN complaints at this time. Has not been sexually active since delivery in Nov, denies any vaginal pain or abnl sympts. Declines STI testing today. Bottle-feeding. Reports mood good and well supported.     She has had upper neck and mid-back pain since delivery, did not have an epidural. She has not yet seen PT but is open to it. IBU and heat have worked for pain.    She has used depo for contraception as recently as Dec, however does not want to continue at this time. Denies actively wanting another child but is also not interested an alternative method. Declines prenatal vitamins. On chart review, she had indicated interest in sterilization, but is not interested at this time.     She is amenable to referral to primary care for cHTN. Denies s/s anemia.         Review of Systems   Musculoskeletal:  Positive for back pain.   All other systems reviewed and are negative.      Objective   Physical Exam  Vitals reviewed.   Constitutional:       Appearance: She is normal weight.   HENT:      Head: Normocephalic and atraumatic.      Mouth/Throat:      Mouth: Mucous membranes are moist.      Pharynx: Oropharynx is clear.   Eyes:      Conjunctiva/sclera: Conjunctivae normal.      Pupils: Pupils are equal, round, and reactive to light.   Pulmonary:      Effort: Pulmonary effort is normal.   Chest:   Breasts:     Right: Normal.      Left: Normal.   Abdominal:      General: Abdomen is flat.      Palpations: Abdomen is soft.   Genitourinary:     General: Normal vulva.      Labia:         Right: Rash present.         Left: Rash present.       Rectum: Normal.          Comments: Normal appearing perineum, vaginal and cervical mucosa. Exudative  vesicular lesion noted L>R on labial majora. Non-tender, patient denied sympts, declined swabbing.   Musculoskeletal:         General: Normal range of motion.      Cervical back: Normal range of motion. No tenderness.      Thoracic back: Tenderness and bony tenderness present. No swelling, deformity, signs of trauma or spasms. Normal range of motion.      Lumbar back: Tenderness and bony tenderness present. No swelling, deformity, signs of trauma or spasms.   Skin:     General: Skin is warm and dry.   Neurological:      General: No focal deficit present.      Mental Status: She is alert and oriented to person, place, and time.   Psychiatric:         Mood and Affect: Mood normal.         Behavior: Behavior normal.         Thought Content: Thought content normal.         Judgment: Judgment normal.         Assessment/Plan   Problem List Items Addressed This Visit             ICD-10-CM    Iron deficiency anemia D50.9    Relevant Orders    CBC    Well woman exam with routine gynecological exam Z01.419    Cervical cancer screening Z12.4    Relevant Orders    THINPREP PAP    Midline thoracic back pain M54.6    Relevant Medications    capsaicin (Zostrix) 0.025 % cream    Encounter for pregnancy test, result negative - Primary Z32.02    Relevant Orders    POCT pregnancy, urine manually resulted (Completed)     RTC as needed or in 1yr.        Emily Wilson MD 05/07/24 12:12 PM

## 2024-05-17 LAB
CYTOLOGY CMNT CVX/VAG CYTO-IMP: NORMAL
LAB AP HPV GENOTYPE QUESTION: YES
LAB AP HPV HR: NORMAL
LABORATORY COMMENT REPORT: NORMAL
PATH REPORT.TOTAL CANCER: NORMAL

## 2024-11-06 DIAGNOSIS — D50.8 OTHER IRON DEFICIENCY ANEMIA: Primary | ICD-10-CM

## 2025-01-15 ENCOUNTER — OFFICE VISIT (OUTPATIENT)
Dept: OBSTETRICS AND GYNECOLOGY | Facility: CLINIC | Age: 30
End: 2025-01-15
Payer: COMMERCIAL

## 2025-01-15 VITALS
SYSTOLIC BLOOD PRESSURE: 120 MMHG | BODY MASS INDEX: 20.37 KG/M2 | WEIGHT: 118.7 LBS | DIASTOLIC BLOOD PRESSURE: 80 MMHG | HEART RATE: 96 BPM

## 2025-01-15 DIAGNOSIS — Z30.42 ENCOUNTER FOR DEPO-PROVERA CONTRACEPTION: Primary | ICD-10-CM

## 2025-01-15 PROCEDURE — 2500000004 HC RX 250 GENERAL PHARMACY W/ HCPCS (ALT 636 FOR OP/ED): Mod: SE | Performed by: OBSTETRICS & GYNECOLOGY

## 2025-01-15 PROCEDURE — 1036F TOBACCO NON-USER: CPT | Performed by: OBSTETRICS & GYNECOLOGY

## 2025-01-15 PROCEDURE — 3074F SYST BP LT 130 MM HG: CPT | Performed by: OBSTETRICS & GYNECOLOGY

## 2025-01-15 PROCEDURE — 99213 OFFICE O/P EST LOW 20 MIN: CPT | Performed by: OBSTETRICS & GYNECOLOGY

## 2025-01-15 PROCEDURE — 96372 THER/PROPH/DIAG INJ SC/IM: CPT | Performed by: OBSTETRICS & GYNECOLOGY

## 2025-01-15 PROCEDURE — 3079F DIAST BP 80-89 MM HG: CPT | Performed by: OBSTETRICS & GYNECOLOGY

## 2025-01-15 RX ORDER — MEDROXYPROGESTERONE ACETATE 150 MG/ML
150 INJECTION, SUSPENSION INTRAMUSCULAR
Status: SHIPPED | OUTPATIENT
Start: 2025-01-15 | End: 2026-04-10

## 2025-01-15 RX ADMIN — MEDROXYPROGESTERONE ACETATE 150 MG: 150 INJECTION, SUSPENSION INTRAMUSCULAR at 14:08

## 2025-01-15 ASSESSMENT — ENCOUNTER SYMPTOMS
RESPIRATORY NEGATIVE: 1
CARDIOVASCULAR NEGATIVE: 1
MUSCULOSKELETAL NEGATIVE: 1
ALLERGIC/IMMUNOLOGIC NEGATIVE: 1
CONSTITUTIONAL NEGATIVE: 1
PSYCHIATRIC NEGATIVE: 1
EYES NEGATIVE: 1
NEUROLOGICAL NEGATIVE: 1
HEMATOLOGIC/LYMPHATIC NEGATIVE: 1
GASTROINTESTINAL NEGATIVE: 1
ENDOCRINE NEGATIVE: 1

## 2025-01-15 ASSESSMENT — PATIENT HEALTH QUESTIONNAIRE - PHQ9
1. LITTLE INTEREST OR PLEASURE IN DOING THINGS: NOT AT ALL
SUM OF ALL RESPONSES TO PHQ9 QUESTIONS 1 AND 2: 0
2. FEELING DOWN, DEPRESSED OR HOPELESS: NOT AT ALL

## 2025-01-15 ASSESSMENT — EDINBURGH POSTNATAL DEPRESSION SCALE (EPDS)
I HAVE BEEN ABLE TO LAUGH AND SEE THE FUNNY SIDE OF THINGS: DEFINITELY NOT SO MUCH NOW
TOTAL SCORE: 15
I HAVE BLAMED MYSELF UNNECESSARILY WHEN THINGS WENT WRONG: YES, MOST OF THE TIME
I HAVE FELT SAD OR MISERABLE: NO, NOT AT ALL
I HAVE FELT SCARED OR PANICKY FOR NO GOOD REASON: YES, SOMETIMES
THINGS HAVE BEEN GETTING ON TOP OF ME: YES, SOMETIMES I HAVEN'T BEEN COPING AS WELL AS USUAL
I HAVE BEEN SO UNHAPPY THAT I HAVE HAD DIFFICULTY SLEEPING: YES, SOMETIMES
I HAVE BEEN SO UNHAPPY THAT I HAVE BEEN CRYING: ONLY OCCASIONALLY
I HAVE LOOKED FORWARD WITH ENJOYMENT TO THINGS: RATHER LESS THAN I USED TO
I HAVE BEEN ANXIOUS OR WORRIED FOR NO GOOD REASON: YES, SOMETIMES
THE THOUGHT OF HARMING MYSELF HAS OCCURRED TO ME: NEVER

## 2025-01-15 ASSESSMENT — PAIN SCALES - GENERAL: PAINLEVEL_OUTOF10: 0-NO PAIN

## 2025-01-15 NOTE — PROGRESS NOTES
Subjective   Patient ID: Imelda Mendoza is a 29 y.o. female who presents for restarting Depo-Provera shot (Delivered baby girl on 2023/LMP unknown however reports spotting/Last pap /PPD score 15).    HPI  Patient is a 29 y.o. year old , most recent delivery 2023, Pap 2024, LMP 2024, last sexual intercourse 2025, unprotected. Pt has been on the depo-provera shot intermittently for many years and would like to continue using depo as contraception. She is aware of other methods of contraception and not currently interested in those methods at this time. Pt states that she has had some light spotting that has not required pads for the past three days and denies other gynecological complaints, including pain, other discharge, and odor. Pt denies current cigarette, ethanol, and recreational drug use. Pt declined STI testing at this visit.    Pt states that her mood is okay and that she does feel down sometimes. Denies suicidal and homicidal ideation. Was offered additional resources, pt is aware of resources but does not feel the need for additional support at this time.        Review of Systems   Constitutional: Negative.    HENT: Negative.     Eyes: Negative.    Respiratory: Negative.     Cardiovascular: Negative.    Gastrointestinal: Negative.    Endocrine: Negative.    Genitourinary:         Very light spotting   Musculoskeletal: Negative.    Skin: Negative.    Allergic/Immunologic: Negative.    Neurological: Negative.    Hematological: Negative.    Psychiatric/Behavioral: Negative.         Objective   Vitals:    01/15/25 1318   BP: 120/80   Pulse: 96      Physical Exam  Constitutional:       Appearance: Normal appearance.   HENT:      Head: Normocephalic and atraumatic.   Eyes:      Extraocular Movements: Extraocular movements intact.      Conjunctiva/sclera: Conjunctivae normal.   Musculoskeletal:         General: Normal range of motion.      Cervical back: Normal range of  motion.   Skin:     General: Skin is warm and dry.   Neurological:      General: No focal deficit present.      Mental Status: She is alert and oriented to person, place, and time.   Psychiatric:         Mood and Affect: Mood normal.         Behavior: Behavior normal.         Thought Content: Thought content normal.       Assessment/Plan        Codes    Encounter for Depo-Provera contraception    -  Primary Z30.42    Relevant Medications    medroxyPROGESTERone (Depo-Provera) injection 150 mg     Imelda Mendoza is a 29 y.o. female who presents for restarting Depo-Provera shot. Urinary pregnancy test is negative. Pt denies gynecological complaints and would like to re-initiate depo shot. Depo shot provided at this visit. Follow-up in three months for next injection.         Quita Merrill, MS3      I, or a resident under my supervision, was present with the medical student who participated in the documentation of this note.  I have personally seen and examined the patient and performed the medical decision-making components. I have reviewed the medical student documentation and/or resident documentation and verified the findings in the note as written with additions or exceptions as stated in the body of the note.    Up to date on preventive health assessments.  Declines flu vaccine.      Abigail Fields MD

## 2025-04-02 ENCOUNTER — APPOINTMENT (OUTPATIENT)
Dept: OBSTETRICS AND GYNECOLOGY | Facility: CLINIC | Age: 30
End: 2025-04-02
Payer: COMMERCIAL

## 2025-04-03 ENCOUNTER — CLINICAL SUPPORT (OUTPATIENT)
Dept: OBSTETRICS AND GYNECOLOGY | Facility: CLINIC | Age: 30
End: 2025-04-03
Payer: COMMERCIAL

## 2025-04-03 PROCEDURE — 2500000004 HC RX 250 GENERAL PHARMACY W/ HCPCS (ALT 636 FOR OP/ED): Mod: SE | Performed by: OBSTETRICS & GYNECOLOGY

## 2025-04-03 PROCEDURE — 96372 THER/PROPH/DIAG INJ SC/IM: CPT | Performed by: OBSTETRICS & GYNECOLOGY

## 2025-04-03 RX ADMIN — MEDROXYPROGESTERONE ACETATE 150 MG: 150 INJECTION, SUSPENSION INTRAMUSCULAR at 14:08

## 2025-04-03 NOTE — PROGRESS NOTES
Patient here for Depo injection.  UPT: None  LMP: None  Last Depo: 1/15/25  Last Annual: 5/7/24  LPN discussed Depo-Provera side effects of calcium.  Depo given IM into right gluteal region. Depo supplied by office/Patient brought her own depo. Patient tolerated well.  Patient to RTC between 6/18/25-7/2/25 for depo and annual).  Patient verbalized understanding and all questions were answered.

## 2025-04-28 ENCOUNTER — HOSPITAL ENCOUNTER (EMERGENCY)
Facility: HOSPITAL | Age: 30
Discharge: HOME | End: 2025-04-28
Payer: COMMERCIAL

## 2025-04-28 ENCOUNTER — CLINICAL SUPPORT (OUTPATIENT)
Dept: EMERGENCY MEDICINE | Facility: HOSPITAL | Age: 30
End: 2025-04-28
Payer: COMMERCIAL

## 2025-04-28 ENCOUNTER — APPOINTMENT (OUTPATIENT)
Dept: RADIOLOGY | Facility: HOSPITAL | Age: 30
End: 2025-04-28
Payer: COMMERCIAL

## 2025-04-28 VITALS
DIASTOLIC BLOOD PRESSURE: 74 MMHG | TEMPERATURE: 97.6 F | HEART RATE: 87 BPM | BODY MASS INDEX: 20.14 KG/M2 | SYSTOLIC BLOOD PRESSURE: 127 MMHG | HEIGHT: 64 IN | RESPIRATION RATE: 18 BRPM | WEIGHT: 118 LBS | OXYGEN SATURATION: 98 %

## 2025-04-28 DIAGNOSIS — J40 BRONCHITIS: ICD-10-CM

## 2025-04-28 DIAGNOSIS — M94.0 COSTOCHONDRITIS: Primary | ICD-10-CM

## 2025-04-28 LAB
FLUAV RNA RESP QL NAA+PROBE: NOT DETECTED
FLUBV RNA RESP QL NAA+PROBE: NOT DETECTED
PREGNANCY TEST URINE, POC: NEGATIVE
RSV RNA RESP QL NAA+PROBE: NOT DETECTED
SARS-COV-2 RNA RESP QL NAA+PROBE: NOT DETECTED

## 2025-04-28 PROCEDURE — 81025 URINE PREGNANCY TEST: CPT | Performed by: PHYSICIAN ASSISTANT

## 2025-04-28 PROCEDURE — 2500000001 HC RX 250 WO HCPCS SELF ADMINISTERED DRUGS (ALT 637 FOR MEDICARE OP): Mod: SE | Performed by: PHYSICIAN ASSISTANT

## 2025-04-28 PROCEDURE — 71046 X-RAY EXAM CHEST 2 VIEWS: CPT

## 2025-04-28 PROCEDURE — RXMED WILLOW AMBULATORY MEDICATION CHARGE

## 2025-04-28 PROCEDURE — 87637 SARSCOV2&INF A&B&RSV AMP PRB: CPT | Performed by: PHYSICIAN ASSISTANT

## 2025-04-28 PROCEDURE — 71046 X-RAY EXAM CHEST 2 VIEWS: CPT | Performed by: RADIOLOGY

## 2025-04-28 PROCEDURE — 99285 EMERGENCY DEPT VISIT HI MDM: CPT | Mod: 25

## 2025-04-28 PROCEDURE — 93005 ELECTROCARDIOGRAM TRACING: CPT

## 2025-04-28 RX ORDER — IBUPROFEN 600 MG/1
600 TABLET ORAL ONCE
Status: COMPLETED | OUTPATIENT
Start: 2025-04-28 | End: 2025-04-28

## 2025-04-28 RX ORDER — NAPROXEN 500 MG/1
500 TABLET ORAL
Qty: 14 TABLET | Refills: 0 | Status: SHIPPED | OUTPATIENT
Start: 2025-04-28 | End: 2025-05-07

## 2025-04-28 RX ORDER — ACETAMINOPHEN 325 MG/1
650 TABLET ORAL EVERY 6 HOURS PRN
Qty: 20 TABLET | Refills: 0 | Status: SHIPPED | OUTPATIENT
Start: 2025-04-28 | End: 2025-05-03

## 2025-04-28 RX ADMIN — IBUPROFEN 600 MG: 600 TABLET, FILM COATED ORAL at 01:15

## 2025-04-28 ASSESSMENT — PAIN DESCRIPTION - DESCRIPTORS: DESCRIPTORS: BURNING

## 2025-04-28 ASSESSMENT — PAIN - FUNCTIONAL ASSESSMENT: PAIN_FUNCTIONAL_ASSESSMENT: 0-10

## 2025-04-28 ASSESSMENT — PAIN SCALES - GENERAL: PAINLEVEL_OUTOF10: 10 - WORST POSSIBLE PAIN

## 2025-04-28 ASSESSMENT — PAIN DESCRIPTION - LOCATION: LOCATION: CHEST

## 2025-04-28 ASSESSMENT — PAIN DESCRIPTION - PAIN TYPE: TYPE: ACUTE PAIN

## 2025-04-28 NOTE — ED TRIAGE NOTES
Pt presents to the ED d/t shortness of breath and chest pain. Pt states this started about an hour ago. Pt denies any other issues or pmhx. Pt is AxOx4.

## 2025-04-28 NOTE — ED PROVIDER NOTES
"Emergency Department Encounter  [facility] EMERGENCY MEDICINE    Patient: Imelda Mendoza  MRN: [id number]  : 1995  Date of Evaluation: 2025  ED Provider: Any Cruz PA-C        History of Present Illness     This is a 29-year-old female with no significant past medical history presents to the ED with approximately 3 days of intermittent cough that is been dry as well as chest burning that is worse with deep inspiration and cough.  She states that she does feel short of breath due to discomfort with inspiration.  Denies any lower extremity swelling or pain.  Denies any fevers or chills.  Denies nasal congestion, rhinorrhea, or sore throat.  Denies any lower extremity swelling or pain.  Denies nausea or vomiting.  Does endorse having some soft stools recently.  Denies any blood in her stool.  Denies ever feeling sick previously.  Denies any family history of coronary artery disease.  Denies any personal history of DVT/PE or coronary artery disease.      History provided by:  Patient   used: No             Visit Vitals  /74 (BP Location: Left arm, Patient Position: Sitting)   Pulse 87   Temp 36.4 °C (97.6 °F) (Temporal)   Resp 18   Ht 1.626 m (5' 4\")   Wt 53.5 kg (118 lb)   SpO2 98%   BMI 20.25 kg/m²   OB Status Having periods   Smoking Status Never   BSA 1.55 m²          Physical Exam       Triage vitals:  T 36.4 °C (97.6 °F)  HR 87  /74  RR 18  O2 98 % None (Room air)    Physical Exam     Physical exam:   General: Vitals noted, no distress. Afebrile.   EENT:  Hearing grossly intact. Normal phonation. MMM. Airway patient. PERRL. EOMI.   Neck: No midline tenderness or paraspinal tenderness. FROM.   Cardiac: Regular, rate, rhythm. Normal S1 and S2.  No murmurs, gallops, rubs.  Diffuse anterior chest wall tenderness palpation without any obvious deformity or step-off.  Pulmonary: Good air exchange. Lungs clear bilaterally. No wheezes, rhonchi, rales. " No accessory muscle use.   Abdomen: Soft, nonsurgical. Nontender. No peritoneal signs.   Back: No CVA tenderness. No midline tenderness or paraspinal tenderness. No obvious deformity or step off.   Extremities: No peripheral edema.  Full range of motion. Moves all extremities freely. No tenderness throughout extremities.   Skin: No rash. Warm and Dry.   Neuro: No focal neurologic deficits. CN 2-12 grossly intact. Sensation equal bilaterally. No weakness.       Results       Labs Reviewed   SARS-COV-2, INFLUENZA A/B AND RSV PCR - Normal       Result Value    Coronavirus 2019, PCR Not Detected      Flu A Result Not Detected      Flu B Result Not Detected      RSV PCR Not Detected      Narrative:     This assay is an FDA-cleared, in vitro diagnostic nucleic acid amplification test for the qualitative detection and differentiation of SARS CoV-2/ Influenza A/B/ RSV from nasopharyngeal specimens collected from individuals with signs and symptoms of respiratory tract infections, and has been validated for use at [facility]. Negative results do not preclude COVID-19/ Influenza A/B/ RSV infections and should not be used as the sole basis for diagnosis, treatment, or other management decisions. Testing for SARS CoV-2 is recommended only for patients who meet current clinical and/or epidemiological criteria defined by federal, state, or local public health directives.   POCT PREGNANCY, URINE - Normal    Preg Test, Ur Negative         XR chest 2 views   Final Result   1. No acute cardiopulmonary abnormality.             Signed by: Juan Pack 4/28/2025 1:35 AM   Dictation workstation:   [id number]            Medical Decision Making & ED Course         ED Course & MDM     Medical Decision Making  This is a 29-year-old healthy female who presents to the ED with dry cough, chest discomfort with coughing as well as shortness of breath.  Vital stable upon arrival to the ED.  On examination lungs clear to  auscultation bilaterally.  She did have anterior reproducible chest wall tenderness palpation.  EKG obtained in triage and showed no acute ischemic changes.  Chest x-ray as well as viral swab ordered.  Patient medicated ibuprofen for her symptoms today.  Patient is PERC negative and I have low clinical suspicion for PE.  Chest x-ray that visualized pneumonia or pneumothorax.  Viral swab negative.  On my reassessment she was feeling improved.  She was informed of these results.  She will be treated for a viral bronchitis and costochondritis today with naproxen and Tylenol.  She was advised about a primary care provider if her symptoms persist.  She was agreeable to this plan.  She was given signs symptoms to return to the ED with.  She was written a note for her job and was discharged from the ED in stable condition.         Diagnoses as of 04/28/25 0241   Costochondritis   Bronchitis         EKG Independent Interpretation:  EKG normal sinus rhythm at 84 bpm without any acute ST elevation or depression.  No T wave inversions.  Normal axis.  QT/QTc 340/401    Independent Result Review and Interpretation: Chest X-Ray as interpreted by me revealed no pneumonia or pneumothorax            Disposition   Discharged    Procedures     Patient was seen independently    Procedures    Any Cruz PA-C  Emergency Medicine      Any Cruz PA-C  04/28/25 0231       Any Cruz PA-C  04/28/25 0241

## 2025-04-28 NOTE — Clinical Note
Imelda Mendoza was seen and treated in our emergency department on 4/28/2025.  She may return to work on 04/30/2025.       If you have any questions or concerns, please don't hesitate to call.      Any Cruz PA-C

## 2025-04-30 ENCOUNTER — PHARMACY VISIT (OUTPATIENT)
Dept: PHARMACY | Facility: CLINIC | Age: 30
End: 2025-04-30
Payer: MEDICARE

## 2025-05-24 ENCOUNTER — HOSPITAL ENCOUNTER (EMERGENCY)
Facility: HOSPITAL | Age: 30
Discharge: HOME | End: 2025-05-24
Attending: EMERGENCY MEDICINE
Payer: COMMERCIAL

## 2025-05-24 ENCOUNTER — CLINICAL SUPPORT (OUTPATIENT)
Dept: EMERGENCY MEDICINE | Facility: HOSPITAL | Age: 30
End: 2025-05-24
Payer: COMMERCIAL

## 2025-05-24 VITALS
OXYGEN SATURATION: 98 % | RESPIRATION RATE: 16 BRPM | DIASTOLIC BLOOD PRESSURE: 80 MMHG | TEMPERATURE: 98.2 F | BODY MASS INDEX: 20.49 KG/M2 | HEIGHT: 64 IN | HEART RATE: 98 BPM | WEIGHT: 120 LBS | SYSTOLIC BLOOD PRESSURE: 128 MMHG

## 2025-05-24 DIAGNOSIS — R31.9 URINARY TRACT INFECTION WITH HEMATURIA, SITE UNSPECIFIED: ICD-10-CM

## 2025-05-24 DIAGNOSIS — K21.9 GASTROESOPHAGEAL REFLUX DISEASE, UNSPECIFIED WHETHER ESOPHAGITIS PRESENT: ICD-10-CM

## 2025-05-24 DIAGNOSIS — R10.13 ABDOMINAL PAIN, EPIGASTRIC: ICD-10-CM

## 2025-05-24 DIAGNOSIS — N39.0 URINARY TRACT INFECTION WITH HEMATURIA, SITE UNSPECIFIED: ICD-10-CM

## 2025-05-24 DIAGNOSIS — R07.9 CHEST PAIN, UNSPECIFIED TYPE: Primary | ICD-10-CM

## 2025-05-24 LAB
APPEARANCE UR: ABNORMAL
ATRIAL RATE: 104 BPM
BACTERIA #/AREA URNS AUTO: ABNORMAL /HPF
BILIRUB UR STRIP.AUTO-MCNC: NEGATIVE MG/DL
COLOR UR: YELLOW
GLUCOSE BLD MANUAL STRIP-MCNC: 95 MG/DL (ref 74–99)
GLUCOSE UR STRIP.AUTO-MCNC: NORMAL MG/DL
KETONES UR STRIP.AUTO-MCNC: NEGATIVE MG/DL
LEUKOCYTE ESTERASE UR QL STRIP.AUTO: ABNORMAL
MUCOUS THREADS #/AREA URNS AUTO: ABNORMAL /LPF
NITRITE UR QL STRIP.AUTO: ABNORMAL
P AXIS: 70 DEGREES
P OFFSET: 206 MS
P ONSET: 164 MS
PH UR STRIP.AUTO: 5.5 [PH]
PR INTERVAL: 116 MS
PREGNANCY TEST URINE, POC: NEGATIVE
PROT UR STRIP.AUTO-MCNC: ABNORMAL MG/DL
Q ONSET: 222 MS
QRS COUNT: 17 BEATS
QRS DURATION: 76 MS
QT INTERVAL: 326 MS
QTC CALCULATION(BAZETT): 428 MS
QTC FREDERICIA: 391 MS
R AXIS: 72 DEGREES
RBC # UR STRIP.AUTO: ABNORMAL MG/DL
RBC #/AREA URNS AUTO: ABNORMAL /HPF
SP GR UR STRIP.AUTO: 1.03
SQUAMOUS #/AREA URNS AUTO: ABNORMAL /HPF
T AXIS: 33 DEGREES
T OFFSET: 385 MS
UROBILINOGEN UR STRIP.AUTO-MCNC: NORMAL MG/DL
VENTRICULAR RATE: 104 BPM
WBC #/AREA URNS AUTO: ABNORMAL /HPF

## 2025-05-24 PROCEDURE — 2500000001 HC RX 250 WO HCPCS SELF ADMINISTERED DRUGS (ALT 637 FOR MEDICARE OP): Mod: SE

## 2025-05-24 PROCEDURE — 99284 EMERGENCY DEPT VISIT MOD MDM: CPT | Performed by: EMERGENCY MEDICINE

## 2025-05-24 PROCEDURE — 81001 URINALYSIS AUTO W/SCOPE: CPT

## 2025-05-24 PROCEDURE — 87077 CULTURE AEROBIC IDENTIFY: CPT

## 2025-05-24 PROCEDURE — 93010 ELECTROCARDIOGRAM REPORT: CPT | Performed by: EMERGENCY MEDICINE

## 2025-05-24 PROCEDURE — 87186 SC STD MICRODIL/AGAR DIL: CPT

## 2025-05-24 PROCEDURE — 93005 ELECTROCARDIOGRAM TRACING: CPT

## 2025-05-24 PROCEDURE — 2500000002 HC RX 250 W HCPCS SELF ADMINISTERED DRUGS (ALT 637 FOR MEDICARE OP, ALT 636 FOR OP/ED): Mod: SE

## 2025-05-24 PROCEDURE — 82947 ASSAY GLUCOSE BLOOD QUANT: CPT

## 2025-05-24 PROCEDURE — 81025 URINE PREGNANCY TEST: CPT

## 2025-05-24 RX ORDER — SULFAMETHOXAZOLE AND TRIMETHOPRIM 800; 160 MG/1; MG/1
1 TABLET ORAL ONCE
Status: COMPLETED | OUTPATIENT
Start: 2025-05-24 | End: 2025-05-24

## 2025-05-24 RX ORDER — FAMOTIDINE 20 MG/1
20 TABLET, FILM COATED ORAL 2 TIMES DAILY
Qty: 14 TABLET | Refills: 0 | Status: SHIPPED | OUTPATIENT
Start: 2025-05-24 | End: 2025-06-03

## 2025-05-24 RX ORDER — SULFAMETHOXAZOLE AND TRIMETHOPRIM 800; 160 MG/1; MG/1
1 TABLET ORAL 2 TIMES DAILY
Qty: 14 TABLET | Refills: 0 | Status: SHIPPED | OUTPATIENT
Start: 2025-05-24 | End: 2025-06-03

## 2025-05-24 RX ORDER — FAMOTIDINE 20 MG/1
20 TABLET, FILM COATED ORAL ONCE
Status: COMPLETED | OUTPATIENT
Start: 2025-05-24 | End: 2025-05-24

## 2025-05-24 RX ORDER — ALUMINUM HYDROXIDE, MAGNESIUM HYDROXIDE, AND SIMETHICONE 2400; 240; 2400 MG/30ML; MG/30ML; MG/30ML
5 SUSPENSION ORAL EVERY 6 HOURS PRN
Qty: 355 ML | Refills: 0 | Status: SHIPPED | OUTPATIENT
Start: 2025-05-24 | End: 2025-06-14

## 2025-05-24 RX ORDER — ACETAMINOPHEN 325 MG/1
975 TABLET ORAL ONCE
Status: COMPLETED | OUTPATIENT
Start: 2025-05-24 | End: 2025-05-24

## 2025-05-24 RX ADMIN — ACETAMINOPHEN 975 MG: 325 TABLET ORAL at 18:29

## 2025-05-24 RX ADMIN — FAMOTIDINE 20 MG: 20 TABLET, FILM COATED ORAL at 18:29

## 2025-05-24 RX ADMIN — SULFAMETHOXAZOLE AND TRIMETHOPRIM 1 TABLET: 800; 160 TABLET ORAL at 20:14

## 2025-05-24 ASSESSMENT — PAIN DESCRIPTION - LOCATION: LOCATION: ABDOMEN

## 2025-05-24 ASSESSMENT — PAIN - FUNCTIONAL ASSESSMENT: PAIN_FUNCTIONAL_ASSESSMENT: 0-10

## 2025-05-24 ASSESSMENT — PAIN SCALES - GENERAL: PAINLEVEL_OUTOF10: 5 - MODERATE PAIN

## 2025-05-24 NOTE — DISCHARGE INSTRUCTIONS
You were seen in the ED today for abdominal pain and chest pain.  You were treated with Pepcid and Tylenol.  Prescriptions have been sent to your pharmacy for Pepcid and Maalox, which you can use as needed for symptom control.  You were also found to have a urinary tract infection and a prescription for Bactrim, an antibiotic, has been sent to your pharmacy as well.  If you develop worsening symptoms despite these medications, including fevers, chills, or flank pain, please return to the emergency department.

## 2025-05-24 NOTE — ED PROVIDER NOTES
Emergency Department Provider Note        History of Present Illness     History provided by: Patient  Limitations to History: None  External Records Reviewed with Brief Summary: None    HPI:  Imelda Mendoza is a 29 y.o. female with no reported past medical history who presents with 2 days of chest and abdominal pain.  She endorses diarrhea 2 days ago, but does not currently have any diarrhea or constipation.  She has not had any nausea or vomiting.  She is unsure of her last menstrual period because she is on Depo.  She denies vaginal discharge and does not have concerns for STIs today.  She states that the pain did initially get better but today has been worse and made it difficult to sleep.  Symptoms are worse after eating, but she has been able to eat and drink.  Nothing seems to make it better.  She has not taken any medications at home for this.  She endorses associated shortness of breath but denies fevers, chills, dysuria, urgency and frequency.    Physical Exam   Triage vitals:  T 36.8 °C (98.2 °F)  HR 98  /80  RR 16  O2 98 % None (Room air)    Physical Exam  Vitals and nursing note reviewed.   Constitutional:       General: She is not in acute distress.     Appearance: She is not toxic-appearing.   Cardiovascular:      Rate and Rhythm: Normal rate and regular rhythm.      Heart sounds: Normal heart sounds.   Pulmonary:      Effort: Pulmonary effort is normal. No respiratory distress.      Breath sounds: Normal breath sounds. No decreased breath sounds, wheezing, rhonchi or rales.   Chest:      Chest wall: No mass or tenderness.   Abdominal:      General: There is no distension.      Palpations: Abdomen is soft. There is no hepatomegaly or splenomegaly.      Tenderness: There is no right CVA tenderness, left CVA tenderness, guarding or rebound. Negative signs include Medrano's sign, Rovsing's sign and McBurney's sign.      Comments: Mild epigastric TTP   Genitourinary:     Comments: Patient  elected to defer  Musculoskeletal:         General: Normal range of motion.      Cervical back: Normal range of motion.   Skin:     General: Skin is warm and dry.      Capillary Refill: Capillary refill takes less than 2 seconds.   Neurological:      Mental Status: She is alert and oriented to person, place, and time.   Psychiatric:         Mood and Affect: Mood normal.         Behavior: Behavior normal.          Medical Decision Making & ED Course   Medical Decision Makin y.o. female with no reported past medical history who presents with chest pain, shortness of breath, and abdominal pain for the last 2 days.  With diarrhea 2 days ago, constellation of symptoms is most concerning for viral gastroenteritis versus GERD.  EKG was not concerning for ischemic changes.  POCT blood glucose normal.  Examination is reassuring with good perfusion, clear lungs bilaterally, abdomen nonperitonitic.  She does not appear dehydrated.  Although she denies urinary symptoms and is on Depo-Provera, urine was collected for POCT pregnancy testing and urinalysis.  Pregnancy testing was negative, however UA did reveal bacteria, leukocyte esterase, and nitrites.  She was given a dose of Bactrim for UTI and a 7-day course was sent to her pharmacy.  She verbalized understanding of return precautions and discharge instructions prior to leaving the ED.  ----    Differential diagnoses considered include but are not limited to: Viral gastroenteritis, GERD, ACS, aortic dissection, pneumonia, PE, pancreatitis, cholecystitis, appendicitis, UTI, pregnancy, DKA, STI     Social Determinants of Health which Significantly Impact Care: None identified     EKG Independent Interpretation: The EKG obtained at 1800 was independently interpreted by myself. It demonstrates sinus tachycardia with a ventricular rate of 104.  Normal axis. Intervals normal. ST segments showed no signs of acute ischemia.  No significant change compared to prior EKG from  4/28/2025.    Independent Result Review and Interpretation: Relevant laboratory and radiographic results were reviewed and independently interpreted by myself.  As necessary, they are commented on in the ED Course.    Chronic conditions affecting the patient's care: None    The patient was discussed with the following consultants/services: None    Care Considerations: As documented above in [facility]    ED Course:  ED Course as of 05/24/25 2012   Sat May 24, 2025   1828 POCT Glucose: 95 [MG]   1902 Preg Test, Ur: Negative [MG]   2004 Leukocyte Esterase, Urine(!): 250 Nika/uL [MG]   2004 Nitrite, Urine(!): 1+ [MG]   2004 WBC, Urine(!): 6-10 [MG]   2004 RBC, Urine(!): 6-10 [MG]   2004 Bacteria, Urine(!): 1+ [MG]      ED Course User Index  [MG] Shea Lopez MD         Diagnoses as of 05/24/25 2012   Chest pain, unspecified type   Abdominal pain, epigastric   Gastroesophageal reflux disease, unspecified whether esophagitis present   Urinary tract infection with hematuria, site unspecified     Disposition   As a result of the work-up, the patient was discharged home.  she was informed of her diagnosis and instructed to come back with any concerns or worsening of condition.  she and was agreeable to the plan as discussed above.  she was given the opportunity to ask questions.  All of the patient's questions were answered.    Patient seen and discussed with ED attending physician.    Shea Lopez MD  Emergency Medicine      Shea Lopez MD  Resident  05/24/25 2012     unspecified type   Abdominal pain, epigastric   Gastroesophageal reflux disease, unspecified whether esophagitis present   Urinary tract infection with hematuria, site unspecified       The patient was seen by the resident/fellow.  I have personally performed a substantive portion of the encounter.  I have seen and examined the patient; agree with the workup, evaluation, MDM, management and diagnosis.  The care plan has been discussed with the resident; I have reviewed the resident’s note and agree with the documented findings.       EKG independently interpreted as above.                  MD Vanita Alamo MD  06/02/25 0030

## 2025-05-25 LAB
BACTERIA UR CULT: NORMAL
HOLD SPECIMEN: NORMAL

## 2025-05-26 PROCEDURE — RXMED WILLOW AMBULATORY MEDICATION CHARGE

## 2025-05-27 ENCOUNTER — PHARMACY VISIT (OUTPATIENT)
Dept: PHARMACY | Facility: CLINIC | Age: 30
End: 2025-05-27
Payer: MEDICARE

## 2025-05-27 LAB — BACTERIA UR CULT: ABNORMAL

## 2025-06-18 ENCOUNTER — CLINICAL SUPPORT (OUTPATIENT)
Dept: OBSTETRICS AND GYNECOLOGY | Facility: CLINIC | Age: 30
End: 2025-06-18
Payer: COMMERCIAL

## 2025-06-18 PROCEDURE — 96372 THER/PROPH/DIAG INJ SC/IM: CPT | Performed by: OBSTETRICS & GYNECOLOGY

## 2025-06-18 PROCEDURE — 2500000004 HC RX 250 GENERAL PHARMACY W/ HCPCS (ALT 636 FOR OP/ED): Mod: JZ,SE | Performed by: OBSTETRICS & GYNECOLOGY

## 2025-06-18 RX ADMIN — MEDROXYPROGESTERONE ACETATE 150 MG: 150 INJECTION, SUSPENSION INTRAMUSCULAR at 13:59

## 2025-06-18 NOTE — PROGRESS NOTES
Patient here for Depo injection.  UPT: On time  LMP: Depo  Last Depo: 4/3/2025  Last Annual: 5/7/2024  LPN discussed Depo-Provera side effects.  Depo given IM into right gluteal region. Depo supplied by office. Patient tolerated well.  Patient to RTC between 9/3- 9/24 for depo (and annual).  Patient verbalized understanding and all questions were answered.

## 2025-07-09 ENCOUNTER — PHARMACY VISIT (OUTPATIENT)
Dept: PHARMACY | Facility: CLINIC | Age: 30
End: 2025-07-09
Payer: MEDICARE

## 2025-07-09 ENCOUNTER — HOSPITAL ENCOUNTER (EMERGENCY)
Facility: HOSPITAL | Age: 30
Discharge: HOME | End: 2025-07-09
Payer: COMMERCIAL

## 2025-07-09 VITALS
SYSTOLIC BLOOD PRESSURE: 124 MMHG | TEMPERATURE: 99 F | OXYGEN SATURATION: 99 % | HEIGHT: 64 IN | HEART RATE: 96 BPM | RESPIRATION RATE: 16 BRPM | BODY MASS INDEX: 20.49 KG/M2 | DIASTOLIC BLOOD PRESSURE: 85 MMHG | WEIGHT: 120 LBS

## 2025-07-09 DIAGNOSIS — M54.2 NECK PAIN: ICD-10-CM

## 2025-07-09 DIAGNOSIS — S39.012A LUMBAR STRAIN, INITIAL ENCOUNTER: Primary | ICD-10-CM

## 2025-07-09 PROCEDURE — 99284 EMERGENCY DEPT VISIT MOD MDM: CPT

## 2025-07-09 PROCEDURE — 99283 EMERGENCY DEPT VISIT LOW MDM: CPT

## 2025-07-09 PROCEDURE — 2500000001 HC RX 250 WO HCPCS SELF ADMINISTERED DRUGS (ALT 637 FOR MEDICARE OP): Mod: SE

## 2025-07-09 PROCEDURE — RXMED WILLOW AMBULATORY MEDICATION CHARGE

## 2025-07-09 RX ORDER — METHOCARBAMOL 500 MG/1
500 TABLET, FILM COATED ORAL 2 TIMES DAILY
Qty: 20 TABLET | Refills: 0 | Status: SHIPPED | OUTPATIENT
Start: 2025-07-09 | End: 2025-07-19

## 2025-07-09 RX ORDER — IBUPROFEN 400 MG/1
800 TABLET, FILM COATED ORAL ONCE
Status: COMPLETED | OUTPATIENT
Start: 2025-07-09 | End: 2025-07-09

## 2025-07-09 RX ORDER — METHOCARBAMOL 500 MG/1
500 TABLET, FILM COATED ORAL ONCE
Status: COMPLETED | OUTPATIENT
Start: 2025-07-09 | End: 2025-07-09

## 2025-07-09 RX ADMIN — IBUPROFEN 800 MG: 400 TABLET ORAL at 12:27

## 2025-07-09 RX ADMIN — METHOCARBAMOL 500 MG: 500 TABLET ORAL at 12:27

## 2025-07-09 ASSESSMENT — PAIN - FUNCTIONAL ASSESSMENT
PAIN_FUNCTIONAL_ASSESSMENT: 0-10
PAIN_FUNCTIONAL_ASSESSMENT: 0-10

## 2025-07-09 ASSESSMENT — PAIN SCALES - GENERAL
PAINLEVEL_OUTOF10: 10 - WORST POSSIBLE PAIN
PAINLEVEL_OUTOF10: 10 - WORST POSSIBLE PAIN

## 2025-07-09 ASSESSMENT — PAIN DESCRIPTION - LOCATION: LOCATION: NECK

## 2025-07-09 NOTE — Clinical Note
Imelda Mendoza was seen and treated in our emergency department on 7/9/2025.  She may return to work on 07/16/2025.       If you have any questions or concerns, please don't hesitate to call.      Gabriella Gordon PA-C

## 2025-07-09 NOTE — ED PROVIDER NOTES
History of Present Illness       Limitations to history: None  Independent Historian: patient  External Records Reviewed: EMR, outside records, Care-everywhere    HPI:  HPI   This is a 30-year-old female with a history of anemia, back pain, anxiety presenting to the ED for neck and low back pain.  States that symptoms started last night but has been on and off for 2 weeks.  States that she works at the [employer] and does a lot of heavy lifting.  Denies falls or trauma.  Denies midline tenderness.  No headache, dizziness, nausea, vomiting.  No chest pain or shortness of breath.  No numbness or tingling.  No urinary incontinence or retention, no history of IV drug use or cancer.      Physical Exam   Physical Exam  Constitutional:       General: She is not in acute distress.     Appearance: Normal appearance. She is not ill-appearing.   HENT:      Head: Normocephalic and atraumatic.      Nose: Nose normal.      Mouth/Throat:      Mouth: Mucous membranes are moist.   Eyes:      Extraocular Movements: Extraocular movements intact.      Pupils: Pupils are equal, round, and reactive to light.   Cardiovascular:      Rate and Rhythm: Normal rate and regular rhythm.      Pulses: Normal pulses.      Heart sounds: Normal heart sounds.   Pulmonary:      Effort: Pulmonary effort is normal.      Breath sounds: Normal breath sounds.   Abdominal:      Palpations: Abdomen is soft.      Tenderness: There is no abdominal tenderness. There is no guarding.   Musculoskeletal:         General: Tenderness (Left-sided upper trapezius pain and left-sided lumbar paraspinal tenderness) present. No swelling or deformity. Normal range of motion.      Cervical back: Normal, normal range of motion and neck supple. No tenderness or bony tenderness.      Thoracic back: Normal. No tenderness or bony tenderness. Normal range of motion.      Lumbar back: Normal. Negative right straight leg raise test and negative left straight leg raise test.  "  Skin:     General: Skin is warm.   Neurological:      General: No focal deficit present.      Mental Status: She is alert.          Triage vitals:  T 37.2 °C (99 °F)  HR 96  /85  RR 16  O2 99 %          Medical Decision Making & ED Course     ED Course & [facility]     Medical Decision Making  Vital signs reviewed, afebrile, normotensive, not tachycardic.  Satting well on room air and speaking full sentences in no acute distress.  History obtained from patient and chart.  Patient states that she has had ongoing left-sided back pain and neck pain, denies falls or trauma but states that she lifts heavy objects for work and thinks she may have pulled a muscle.  Denies any radicular symptoms.  No numbness or tingling.  No trauma or falls concerning for fracture or dislocation.  Negative straight leg test bilaterally.  Strength 5 out of 5 upper and lower extremities, sensation intact bilaterally.  No red flag back pain symptoms.  No saddle anesthesia, urinary incontinence or retention, history of IV drug use or cancer.  Symptoms likely musculoskeletal in nature, will treat with Robaxin and ibuprofen.  States that she took the bus here and is not planning to drive today.  Patient given prescription for Robaxin and information for orthopedics if symptoms continue.  Patient states that she wants a work note for the next week, is unsure if she would be returned to work but note was given.  Patient agreeable to plan and discharged in stable condition      ----    Visit Vitals  /85   Pulse 96   Temp 37.2 °C (99 °F) (Temporal)   Resp 16   Ht 1.626 m (5' 4\")   Wt 54.4 kg (120 lb)   SpO2 99%   BMI 20.60 kg/m²   OB Status Having periods   Smoking Status Never   BSA 1.57 m²        Labs Reviewed - No data to display    No orders to display       ED Course:  Diagnoses as of 07/09/25 1433   Lumbar strain, initial encounter   Neck pain     Disposition   As result of the workup, the patient was discharged home.  Patient was " informed of their diagnosis and instructed to come back with any concerns or worsening of condition, agreeable to the plan above.  Patient given the opportunity ask questions, all of the patient's questions were answered.      Procedures   Procedures    COMMENT: Please note this report has been produced using speech recognition software and may contain errors related that system including errors in grammar, punctuation, spelling as well as words and phrases that may be inappropriate.  If there are any questions or concerns please feel free to contact the dictating provider for clarification.     Gabriella Gordon PA-C  Emergency Medicine      Gabriella Gordon PA-C  07/09/25 [id number]

## 2025-07-09 NOTE — DISCHARGE INSTRUCTIONS
You were seen today for back and neck pain.  You were treated with ibuprofen and Robaxin, I sent you a prescription for Robaxin.  Take this twice a day for 10 days, do not drive a car while taking this medication.  I also gave you information for orthopedics if symptoms persist.  Return to ED if symptoms worsen or new symptoms arise.

## 2025-07-09 NOTE — ED TRIAGE NOTES
Pt presents to ED for neck and low back pain. Pt states pain started last night. Pt states pain has been off and on x 2 weeks. Pt denies trauma/injury. Pt denies HA, dizziness. Pt denies significant medical history.

## 2025-07-12 DIAGNOSIS — S39.012A LUMBAR STRAIN, INITIAL ENCOUNTER: ICD-10-CM

## 2025-07-16 RX ORDER — METHOCARBAMOL 500 MG/1
500 TABLET, FILM COATED ORAL 2 TIMES DAILY
Qty: 20 TABLET | Refills: 0 | OUTPATIENT
Start: 2025-07-16 | End: 2025-07-26

## 2025-08-28 ASSESSMENT — ENCOUNTER SYMPTOMS
WEAKNESS: 1
DYSURIA: 0
NUMBNESS: 1
HEADACHES: 0
ABDOMINAL PAIN: 0
TINGLING: 0
LEG PAIN: 1
PARESIS: 0
WEIGHT LOSS: 0
FEVER: 0
BACK PAIN: 1
PERIANAL NUMBNESS: 0
BOWEL INCONTINENCE: 0
PARESTHESIAS: 0

## 2025-09-04 ENCOUNTER — OFFICE VISIT (OUTPATIENT)
Dept: OBSTETRICS AND GYNECOLOGY | Facility: CLINIC | Age: 30
End: 2025-09-04
Payer: COMMERCIAL

## 2025-09-04 VITALS
BODY MASS INDEX: 21.54 KG/M2 | SYSTOLIC BLOOD PRESSURE: 128 MMHG | HEART RATE: 105 BPM | DIASTOLIC BLOOD PRESSURE: 87 MMHG | WEIGHT: 126.2 LBS | HEIGHT: 64 IN

## 2025-09-04 DIAGNOSIS — Z32.02 PREGNANCY TEST NEGATIVE: Primary | ICD-10-CM

## 2025-09-04 DIAGNOSIS — M54.6 CHRONIC MIDLINE THORACIC BACK PAIN: ICD-10-CM

## 2025-09-04 DIAGNOSIS — Z01.419 WELL WOMAN EXAM WITH ROUTINE GYNECOLOGICAL EXAM: ICD-10-CM

## 2025-09-04 DIAGNOSIS — G89.29 CHRONIC MIDLINE THORACIC BACK PAIN: ICD-10-CM

## 2025-09-04 DIAGNOSIS — F41.9 ANXIETY: ICD-10-CM

## 2025-09-04 PROBLEM — Z12.4 CERVICAL CANCER SCREENING: Status: RESOLVED | Noted: 2024-05-07 | Resolved: 2025-09-04

## 2025-09-04 PROBLEM — K80.20 CHOLELITHIASIS: Status: RESOLVED | Noted: 2024-05-07 | Resolved: 2025-09-04

## 2025-09-04 PROBLEM — D62 ACUTE BLOOD LOSS ANEMIA: Status: RESOLVED | Noted: 2023-11-28 | Resolved: 2025-09-04

## 2025-09-04 PROBLEM — R51.9 HEADACHE: Status: RESOLVED | Noted: 2024-05-07 | Resolved: 2025-09-04

## 2025-09-04 LAB — PREGNANCY TEST URINE, POC: NEGATIVE

## 2025-09-04 PROCEDURE — 81025 URINE PREGNANCY TEST: CPT | Mod: GC

## 2025-09-04 PROCEDURE — 99213 OFFICE O/P EST LOW 20 MIN: CPT

## 2025-09-04 PROCEDURE — 3008F BODY MASS INDEX DOCD: CPT

## 2025-09-04 PROCEDURE — 3079F DIAST BP 80-89 MM HG: CPT

## 2025-09-04 PROCEDURE — 99395 PREV VISIT EST AGE 18-39: CPT

## 2025-09-04 PROCEDURE — 2500000004 HC RX 250 GENERAL PHARMACY W/ HCPCS (ALT 636 FOR OP/ED): Mod: JZ,SE | Performed by: OBSTETRICS & GYNECOLOGY

## 2025-09-04 PROCEDURE — 1036F TOBACCO NON-USER: CPT

## 2025-09-04 PROCEDURE — 3074F SYST BP LT 130 MM HG: CPT

## 2025-09-04 PROCEDURE — 96372 THER/PROPH/DIAG INJ SC/IM: CPT | Performed by: OBSTETRICS & GYNECOLOGY

## 2025-09-04 RX ORDER — BUPROPION HYDROCHLORIDE 150 MG/1
150 TABLET ORAL DAILY
Qty: 30 TABLET | Refills: 11 | Status: SHIPPED | OUTPATIENT
Start: 2025-09-04 | End: 2026-09-04

## 2025-09-04 RX ORDER — HYDROXYZINE HYDROCHLORIDE 10 MG/1
10 TABLET, FILM COATED ORAL EVERY 8 HOURS PRN
Qty: 30 TABLET | Refills: 1 | Status: SHIPPED | OUTPATIENT
Start: 2025-09-04

## 2025-09-04 RX ADMIN — MEDROXYPROGESTERONE ACETATE 150 MG: 150 INJECTION, SUSPENSION INTRAMUSCULAR at 11:53

## 2025-09-04 ASSESSMENT — PAIN SCALES - GENERAL: PAINLEVEL_OUTOF10: 10-WORST PAIN EVER
